# Patient Record
Sex: FEMALE | Race: BLACK OR AFRICAN AMERICAN | NOT HISPANIC OR LATINO | Employment: OTHER | URBAN - METROPOLITAN AREA
[De-identification: names, ages, dates, MRNs, and addresses within clinical notes are randomized per-mention and may not be internally consistent; named-entity substitution may affect disease eponyms.]

---

## 2017-04-08 ENCOUNTER — HOSPITAL ENCOUNTER (EMERGENCY)
Facility: OTHER | Age: 82
Discharge: HOME | End: 2017-04-08
Attending: EMERGENCY MEDICINE
Payer: MEDICARE

## 2017-04-08 VITALS
HEART RATE: 72 BPM | SYSTOLIC BLOOD PRESSURE: 108 MMHG | BODY MASS INDEX: 27.6 KG/M2 | TEMPERATURE: 98 F | DIASTOLIC BLOOD PRESSURE: 56 MMHG | WEIGHT: 150 LBS | OXYGEN SATURATION: 95 % | RESPIRATION RATE: 18 BRPM | HEIGHT: 62 IN

## 2017-04-08 DIAGNOSIS — N30.00 ACUTE CYSTITIS WITHOUT HEMATURIA: Primary | ICD-10-CM

## 2017-04-08 LAB
ALBUMIN SERPL BCP-MCNC: 3.8 G/DL
ALP SERPL-CCNC: 58 U/L
ALT SERPL W/O P-5'-P-CCNC: 19 U/L
ANION GAP SERPL CALC-SCNC: 12 MMOL/L
AST SERPL-CCNC: 17 U/L
BACTERIA #/AREA URNS HPF: ABNORMAL /HPF
BASOPHILS # BLD AUTO: 0 K/UL
BASOPHILS NFR BLD: 0 %
BILIRUB SERPL-MCNC: 0.5 MG/DL
BILIRUB UR QL STRIP: NEGATIVE
BUN SERPL-MCNC: 24 MG/DL
CALCIUM SERPL-MCNC: 8.3 MG/DL
CHLORIDE SERPL-SCNC: 103 MMOL/L
CLARITY UR: ABNORMAL
CO2 SERPL-SCNC: 23 MMOL/L
COLOR UR: YELLOW
CREAT SERPL-MCNC: 0.8 MG/DL
DIFFERENTIAL METHOD: ABNORMAL
EOSINOPHIL # BLD AUTO: 0 K/UL
EOSINOPHIL NFR BLD: 0.9 %
ERYTHROCYTE [DISTWIDTH] IN BLOOD BY AUTOMATED COUNT: 13.8 %
EST. GFR  (AFRICAN AMERICAN): >60 ML/MIN/1.73 M^2
EST. GFR  (NON AFRICAN AMERICAN): >60 ML/MIN/1.73 M^2
GLUCOSE SERPL-MCNC: 133 MG/DL
GLUCOSE UR QL STRIP: NEGATIVE
HCT VFR BLD AUTO: 43.4 %
HGB BLD-MCNC: 14.3 G/DL
HGB UR QL STRIP: ABNORMAL
KETONES UR QL STRIP: NEGATIVE
LEUKOCYTE ESTERASE UR QL STRIP: ABNORMAL
LIPASE SERPL-CCNC: 6 U/L
LYMPHOCYTES # BLD AUTO: 0.4 K/UL
LYMPHOCYTES NFR BLD: 7.6 %
MCH RBC QN AUTO: 29.5 PG
MCHC RBC AUTO-ENTMCNC: 32.9 %
MCV RBC AUTO: 90 FL
MICROSCOPIC COMMENT: ABNORMAL
MONOCYTES # BLD AUTO: 0.3 K/UL
MONOCYTES NFR BLD: 5.4 %
NEUTROPHILS # BLD AUTO: 4 K/UL
NEUTROPHILS NFR BLD: 85.7 %
NITRITE UR QL STRIP: POSITIVE
PH UR STRIP: 6 [PH] (ref 5–8)
PLATELET # BLD AUTO: 176 K/UL
PMV BLD AUTO: 9.8 FL
POTASSIUM SERPL-SCNC: 3.8 MMOL/L
PROT SERPL-MCNC: 6.8 G/DL
PROT UR QL STRIP: NEGATIVE
RBC # BLD AUTO: 4.85 M/UL
RBC #/AREA URNS HPF: 7 /HPF (ref 0–4)
SODIUM SERPL-SCNC: 138 MMOL/L
SP GR UR STRIP: 1.02 (ref 1–1.03)
SQUAMOUS #/AREA URNS HPF: 2 /HPF
URN SPEC COLLECT METH UR: ABNORMAL
UROBILINOGEN UR STRIP-ACNC: NEGATIVE EU/DL
WBC # BLD AUTO: 4.62 K/UL
WBC #/AREA URNS HPF: 40 /HPF (ref 0–5)
WBC CLUMPS URNS QL MICRO: ABNORMAL

## 2017-04-08 PROCEDURE — 96365 THER/PROPH/DIAG IV INF INIT: CPT

## 2017-04-08 PROCEDURE — 63600175 PHARM REV CODE 636 W HCPCS: Performed by: PHYSICIAN ASSISTANT

## 2017-04-08 PROCEDURE — 96375 TX/PRO/DX INJ NEW DRUG ADDON: CPT

## 2017-04-08 PROCEDURE — 87088 URINE BACTERIA CULTURE: CPT

## 2017-04-08 PROCEDURE — 96361 HYDRATE IV INFUSION ADD-ON: CPT

## 2017-04-08 PROCEDURE — 80053 COMPREHEN METABOLIC PANEL: CPT

## 2017-04-08 PROCEDURE — 83690 ASSAY OF LIPASE: CPT

## 2017-04-08 PROCEDURE — 85025 COMPLETE CBC W/AUTO DIFF WBC: CPT

## 2017-04-08 PROCEDURE — 81000 URINALYSIS NONAUTO W/SCOPE: CPT

## 2017-04-08 PROCEDURE — 25000003 PHARM REV CODE 250: Performed by: PHYSICIAN ASSISTANT

## 2017-04-08 PROCEDURE — 99284 EMERGENCY DEPT VISIT MOD MDM: CPT | Mod: 25

## 2017-04-08 PROCEDURE — 87086 URINE CULTURE/COLONY COUNT: CPT

## 2017-04-08 RX ORDER — CEPHALEXIN 500 MG/1
500 CAPSULE ORAL EVERY 12 HOURS
Qty: 14 CAPSULE | Refills: 0 | Status: SHIPPED | OUTPATIENT
Start: 2017-04-08 | End: 2017-04-15

## 2017-04-08 RX ORDER — ONDANSETRON 2 MG/ML
4 INJECTION INTRAMUSCULAR; INTRAVENOUS
Status: COMPLETED | OUTPATIENT
Start: 2017-04-08 | End: 2017-04-08

## 2017-04-08 RX ORDER — TRIMETHOPRIM 100 MG/1
100 TABLET ORAL DAILY
Status: ON HOLD | COMMUNITY
End: 2018-03-16

## 2017-04-08 RX ADMIN — CEFTRIAXONE 1 G: 1 INJECTION, SOLUTION INTRAVENOUS at 02:04

## 2017-04-08 RX ADMIN — ONDANSETRON 4 MG: 2 INJECTION, SOLUTION INTRAMUSCULAR; INTRAVENOUS at 01:04

## 2017-04-08 RX ADMIN — SODIUM CHLORIDE 1000 ML: 0.9 INJECTION, SOLUTION INTRAVENOUS at 01:04

## 2017-04-08 NOTE — ED PROVIDER NOTES
Encounter Date: 4/8/2017       History     Chief Complaint   Patient presents with    Nausea     reports history of UTIs, reports feeling nauseous this AM     Review of patient's allergies indicates:   Allergen Reactions    Aspirin Shortness Of Breath     HPI Comments: Patient is a 89 y.o. female with a past medical history of hyperlipidemia and frequent urinary tract infections, presenting to the emergency department with complaints of nausea and weakness.  The patient is accompanied by her daughter who is reporting a caregiver.  She reports that since this morning she's had persistent nausea with generalized weakness.  She denies vomiting or diarrhea.  She denies fever or chills.  The patient's daughter states that the patient somehow obtain a pie from an unknown date and source and was found to have eaten it all this morning.  The patient's daughter reports that she typically does not eat a large amount of sweets.  She states she is concerned that food may be old as well.  The patient is also reporting some mild back pain.  The patient's daughter states that this is one of her typical symptoms when she has a urinary tract infection.  She admits she is treated by a urologist at [facility] for recurrent UTIs with Bactrim. She states she takes 2 months of the antibiotic, takes 2 weeks off and restarts. Patient is currently in the middle of the 2 weeks of no antibiotic treatment. She denies fever, chills, dysuria.     The history is provided by the patient.     Past Medical History:   Diagnosis Date    Hyperlipemia     UTI (urinary tract infection)      Past Surgical History:   Procedure Laterality Date    CHOLECYSTECTOMY      HEMORRHOID SURGERY      HERNIA REPAIR      HYSTERECTOMY      KNEE SURGERY       History reviewed. No pertinent family history.  Social History   Substance Use Topics    Smoking status: Never Smoker    Smokeless tobacco: None    Alcohol use No     Review of Systems   Constitutional:  Positive for fatigue. Negative for activity change, appetite change, chills and fever.   HENT: Negative for congestion, rhinorrhea and sore throat.    Eyes: Negative for photophobia and visual disturbance.   Respiratory: Negative for cough, shortness of breath and wheezing.    Cardiovascular: Negative for chest pain.   Gastrointestinal: Positive for nausea. Negative for abdominal pain, diarrhea and vomiting.   Genitourinary: Negative for dysuria, hematuria and urgency.   Musculoskeletal: Positive for back pain. Negative for myalgias and neck pain.   Skin: Negative for color change and wound.   Neurological: Negative for weakness and headaches.   Psychiatric/Behavioral: Negative for agitation and confusion.       Physical Exam   Initial Vitals   BP Pulse Resp Temp SpO2   04/08/17 1234 04/08/17 1234 04/08/17 1234 04/08/17 1234 04/08/17 1234   108/63 97 18 98 °F (36.7 °C) 96 %     Physical Exam    Nursing note and vitals reviewed.  Constitutional: Vital signs are normal. She appears well-developed and well-nourished. She is not diaphoretic. She is cooperative.  Non-toxic appearance. She does not have a sickly appearance. She does not appear ill. No distress.   Elderly female accompanied by her daughter in the emergency department.  She is speaking in clear and full sentences, is in no acute distress.   HENT:   Head: Normocephalic and atraumatic.   Right Ear: External ear normal.   Left Ear: External ear normal.   Nose: Nose normal.   Mouth/Throat: Oropharynx is clear and moist.   Eyes: Conjunctivae and EOM are normal.   Neck: Normal range of motion. Neck supple.   Cardiovascular: Normal rate, regular rhythm and normal heart sounds.   Pulmonary/Chest: Breath sounds normal. No respiratory distress. She has no wheezes.   Abdominal: Soft. Bowel sounds are normal. She exhibits no distension. There is no tenderness. There is no rebound and no guarding.   Musculoskeletal: Normal range of motion.   Neurological: She is alert  and oriented to person, place, and time. GCS eye subscore is 4. GCS verbal subscore is 5. GCS motor subscore is 6.   AAOx4.    Skin: Skin is warm.   Psychiatric: She has a normal mood and affect. Her behavior is normal. Judgment and thought content normal.         ED Course   Procedures  Labs Reviewed   URINALYSIS - Abnormal; Notable for the following:        Result Value    Appearance, UA Hazy (*)     Occult Blood UA 1+ (*)     Nitrite, UA Positive (*)     Leukocytes, UA 1+ (*)     All other components within normal limits   CBC W/ AUTO DIFFERENTIAL - Abnormal; Notable for the following:     Lymph # 0.4 (*)     Gran% 85.7 (*)     Lymph% 7.6 (*)     All other components within normal limits   COMPREHENSIVE METABOLIC PANEL - Abnormal; Notable for the following:     Glucose 133 (*)     Calcium 8.3 (*)     All other components within normal limits   URINALYSIS MICROSCOPIC - Abnormal; Notable for the following:     RBC, UA 7 (*)     WBC, UA 40 (*)     WBC Clumps, UA Occasional (*)     Bacteria, UA Many (*)     All other components within normal limits   CULTURE, URINE   LIPASE             Medical Decision Making:   History:   Old Medical Records: I decided to obtain old medical records.  Old Records Summarized: other records.       <> Summary of Records: Reviewed medical record in Epic.  Patient has extensive history of urosepsis.  Clinical Tests:   Lab Tests: Ordered and Reviewed  The following lab test(s) were unremarkable: CBC, CMP, Lipase and Urinalysis  Other:   I have discussed this case with another health care provider.       <> Summary of the Discussion: Dr. Longo  This note was created using Dragon Medical Dictation. There may be typographical errors secondary to dictation.     Urgent evaluation of a 89 y.o. female with a past medical history of HLD and UTI, presenting to the emergency department complaining of weakness and nausea. Patient is afebrile, nontoxic appearing and hemodynamically stable.   Physical exam reveals regular rate and rhythm, lungs are clear to auscultation bilaterally.  Abdomen is soft and nontender with normal bowel sounds.  I reviewed the patient's medical record that shows an extensive history of urosepsis.  Patient has normal vital signs on arrival, no evidence of sepsis at this time.  Will plan to obtain blood work, UA, administer IV fluids, Zofran and reassess.  UA shows nitrite positive urinary tract infection.  CBC shows no leukocytosis, normal H&H.  CMP shows no acute electrolyte abnormalitiy, normal BUN and creatinine.  Lipase is normal.  Will plan to send a urine culture.  I did review the patient's previous urine culture that showed susceptibility to ceftriaxone as well as Keflex.  Will administer 1 g of ceftriaxone the emergency department and plan to discharge home with Keflex.  The patient was counseled extensively on symptomatically care and treatment, I did recommend that she obtain close follow-up with her urologist. The patient was instructed to follow up with a primary care provider in 2 days or to return to the emergency department for worsening symptoms. The treatment plan was discussed with the patient who demonstrated understanding and comfort with plan. The patient's history, physical exam, and plan of care was discussed with and agreed upon with my supervising physician.     Past Medical History:   Diagnosis Date    Hyperlipemia     UTI (urinary tract infection)                      ED Course     Clinical Impression:     1. Acute cystitis without hematuria         Disposition:   Disposition: Discharged  Condition: Stable       Zainab Mendoza PA-C  04/08/17 1503

## 2017-04-08 NOTE — ED AVS SNAPSHOT
[facility]  [address]  [facility] [address]               Cammy Brown   2017 12:37 PM   ED    Description:  Female : 8/10/1925   Department:  [facility]           Your Care was Coordinated By:     Provider Role From To    Donaldo Longo DO Attending Provider 17 1240 --    Zainab Mendoza PA-C Physician Assistant 17 1239 --      Reason for Visit     Nausea           Diagnoses this Visit        Comments    Acute cystitis without hematuria    -  Primary       ED Disposition     None           To Do List           Follow-up Information     Follow up with Domo Owens MD In 2 days.    Specialty:  Internal Medicine    Contact information:    [address] [id number] [phone]          Follow up with [facility].    Specialty:  Emergency Medicine    Why:  If symptoms worsen    Contact information:    [address]  [facility] [address] [phone]       These Medications        Disp Refills Start End    cephALEXin (KEFLEX) 500 MG capsule 14 capsule 0 2017 4/15/2017    Take 1 capsule (500 mg total) by mouth every 12 (twelve) hours. - Oral    Pharmacy: [facility]/pharmacy #[id number] - [place], [place] - [address]  #: [phone]         [facility]     [facility] Nurse Care Line - 24/7 Assistance  Unless otherwise directed by your provider, please contact [facility], our nurse care line that is available for 24/7 assistance.     Registered nurses in the [facility] provide: appointment scheduling, clinical advisement, health education, and other advisory services.  Call: [phone] (toll free)               Medications           Message regarding Medications     Verify the changes and/or additions to your medication regime listed below are the same as discussed with your clinician today.  If any of these changes or  "additions are incorrect, please notify your healthcare provider.        START taking these NEW medications        Refills    cephALEXin (KEFLEX) 500 MG capsule 0    Sig: Take 1 capsule (500 mg total) by mouth every 12 (twelve) hours.    Class: Print    Route: Oral      These medications were administered today        Dose Freq    sodium chloride 0.9% bolus 1,000 mL 1,000 mL ED 1 Time    Sig: Inject 1,000 mLs into the vein ED 1 Time.    Class: Normal    Route: Intravenous    ondansetron injection 4 mg 4 mg ED 1 Time    Sig: Inject 4 mg into the vein ED 1 Time.    Class: Normal    Route: Intravenous    cefTRIAXone (ROCEPHIN) 1 g in dextrose 5 % 50 mL IVPB 1 g ED 1 Time    Sig: Inject 50 mLs (1 g total) into the vein ED 1 Time.    Class: Normal    Route: Intravenous           Verify that the below list of medications is an accurate representation of the medications you are currently taking.  If none reported, the list may be blank. If incorrect, please contact your healthcare provider. Carry this list with you in case of emergency.           Current Medications     darifenacin (ENABLEX) 15 mg 24 hr tablet Take 15 mg by mouth once daily.    dorzolamide-timolol 2-0.5% (COSOPT) 22.3-6.8 mg/mL ophthalmic solution Place 1 drop into both eyes 2 (two) times daily.    simvastatin (ZOCOR) 40 MG tablet Take 40 mg by mouth every evening.    trimethoprim (TRIMPEX) 100 mg Tab Take 100 mg by mouth once daily. Daily for 2 months, then off for 2 weeks, (scheduled to restart 4/12/17)    cefTRIAXone (ROCEPHIN) 1 g in dextrose 5 % 50 mL IVPB Inject 50 mLs (1 g total) into the vein ED 1 Time.    cephALEXin (KEFLEX) 500 MG capsule Take 1 capsule (500 mg total) by mouth every 12 (twelve) hours.    EYE DROPS DISPENSER MISC Place 1 drop into both eyes 2 (two) times daily.            Clinical Reference Information           Your Vitals Were     BP Pulse Temp Resp Height Weight    113/55 76 98 °F (36.7 °C) 18 5' 2" (1.575 m) 68 kg (150 lb)    " Last Period SpO2 BMI          (LMP Unknown) 95% 27.44 kg/m2        Allergies as of 4/8/2017        Reactions    Aspirin Shortness Of Breath      Immunizations Administered on Date of Encounter - 4/8/2017     None      ED Micro, Lab, POCT     Start Ordered       Status Ordering Provider    04/08/17 1451 04/08/17 1450  Urine culture  Add-on      Completed     04/08/17 1250 04/08/17 1250  Urinalysis  STAT      Final result     04/08/17 1250 04/08/17 1250  CBC auto differential  STAT      Final result     04/08/17 1250 04/08/17 1250  Comprehensive metabolic panel  STAT      Final result     04/08/17 1250 04/08/17 1250  Lipase  STAT      Final result     04/08/17 1250 04/08/17 1250  Urinalysis Microscopic  Once      Final result       ED Imaging Orders     None      Discharge References/Attachments     BLADDER INFECTION, FEMALE (ADULT) (ENGLISH)    NAUSEA AND VOMITING, HOW TO CONTROL (ENGLISH)      [facility] Sign-Up     Activating your [facility] account is as easy as 1-2-3!     1) Visit [email or web address], select Sign Up Now, enter this activation code and your date of birth, then select Next.  [email or web address]  Expires: 5/23/2017  2:54 PM      2) Create a username and password to use when you visit [facility] in the future and select a security question in case you lose your password and select Next.    3) Enter your e-mail address and click Sign Up!    Additional Information  If you have questions, please e-mail [email or web address] or call [phone] to talk to our [facility] staff. Remember, [facility] is NOT to be used for urgent needs. For medical emergencies, dial 911.          [facility] complies with applicable Federal civil rights laws and does not discriminate on the basis of race, color, national origin, age, disability, or sex.        Language Assistance Services     ATTENTION: Language assistance services are available, free of charge. Please call [phone].      ATENCIÓN: Israel velasco  español, tiene a neal disposición servicios gratuitos de asistencia lingüística. Llame al [phone].     VANE Ý: N?u b?n nói Ti?ng Vi?t, có các d?ch v? h? tr? ngôn ng? mi?n phí dành cho b?n. G?i s? [phone].

## 2017-04-08 NOTE — ED TRIAGE NOTES
Pt reports to ED c/o nausea starting 0400, denies vomiting/diarrhea. Pt also reporting bilateral lower back pain worsening with ambulation, PMH of chronic UTI, pt on routine PO antibiotics to treat UTI, pt due to start abx Wednesday. Denies fevers, chills, dysuria/hematuria, V/D, chest pain, sOB.

## 2017-04-10 LAB — BACTERIA UR CULT: NORMAL

## 2018-03-14 ENCOUNTER — HOSPITAL ENCOUNTER (OUTPATIENT)
Facility: OTHER | Age: 83
Discharge: HOME | End: 2018-03-16
Attending: EMERGENCY MEDICINE | Admitting: EMERGENCY MEDICINE
Payer: MEDICARE

## 2018-03-14 DIAGNOSIS — N30.01 ACUTE CYSTITIS WITH HEMATURIA: ICD-10-CM

## 2018-03-14 DIAGNOSIS — R53.83 LETHARGY: ICD-10-CM

## 2018-03-14 DIAGNOSIS — R93.89 ABNORMAL CHEST X-RAY: ICD-10-CM

## 2018-03-14 DIAGNOSIS — N39.0 URINARY TRACT INFECTION WITHOUT HEMATURIA, SITE UNSPECIFIED: Primary | ICD-10-CM

## 2018-03-14 PROBLEM — J18.9 RIGHT LOWER LOBE PNEUMONIA: Status: ACTIVE | Noted: 2018-03-14

## 2018-03-14 LAB
ALBUMIN SERPL BCP-MCNC: 3.6 G/DL
ALP SERPL-CCNC: 60 U/L
ALT SERPL W/O P-5'-P-CCNC: 13 U/L
ANION GAP SERPL CALC-SCNC: 12 MMOL/L
AST SERPL-CCNC: 17 U/L
BACTERIA #/AREA URNS HPF: ABNORMAL /HPF
BASOPHILS # BLD AUTO: 0.02 K/UL
BASOPHILS NFR BLD: 0.2 %
BILIRUB SERPL-MCNC: 0.9 MG/DL
BILIRUB UR QL STRIP: NEGATIVE
BUN SERPL-MCNC: 17 MG/DL
CALCIUM SERPL-MCNC: 9.3 MG/DL
CHLORIDE SERPL-SCNC: 106 MMOL/L
CLARITY UR: ABNORMAL
CO2 SERPL-SCNC: 20 MMOL/L
COLOR UR: YELLOW
CREAT SERPL-MCNC: 0.7 MG/DL
DIFFERENTIAL METHOD: ABNORMAL
EOSINOPHIL # BLD AUTO: 0 K/UL
EOSINOPHIL NFR BLD: 0.2 %
ERYTHROCYTE [DISTWIDTH] IN BLOOD BY AUTOMATED COUNT: 13.7 %
EST. GFR  (AFRICAN AMERICAN): >60 ML/MIN/1.73 M^2
EST. GFR  (NON AFRICAN AMERICAN): >60 ML/MIN/1.73 M^2
GLUCOSE SERPL-MCNC: 122 MG/DL
GLUCOSE UR QL STRIP: NEGATIVE
HCT VFR BLD AUTO: 41.1 %
HGB BLD-MCNC: 13.3 G/DL
HGB UR QL STRIP: ABNORMAL
HYALINE CASTS #/AREA URNS LPF: 0 /LPF
KETONES UR QL STRIP: ABNORMAL
LACTATE SERPL-SCNC: 1 MMOL/L
LEUKOCYTE ESTERASE UR QL STRIP: ABNORMAL
LYMPHOCYTES # BLD AUTO: 0.9 K/UL
LYMPHOCYTES NFR BLD: 7.4 %
MCH RBC QN AUTO: 29.3 PG
MCHC RBC AUTO-ENTMCNC: 32.4 G/DL
MCV RBC AUTO: 91 FL
MICROSCOPIC COMMENT: ABNORMAL
MONOCYTES # BLD AUTO: 0.9 K/UL
MONOCYTES NFR BLD: 7.5 %
NEUTROPHILS # BLD AUTO: 10.4 K/UL
NEUTROPHILS NFR BLD: 84 %
NITRITE UR QL STRIP: POSITIVE
PH UR STRIP: 6 [PH] (ref 5–8)
PLATELET # BLD AUTO: 213 K/UL
PMV BLD AUTO: 10.3 FL
POTASSIUM SERPL-SCNC: 3.7 MMOL/L
PROT SERPL-MCNC: 7.3 G/DL
PROT UR QL STRIP: ABNORMAL
RBC # BLD AUTO: 4.54 M/UL
RBC #/AREA URNS HPF: 5 /HPF (ref 0–4)
SODIUM SERPL-SCNC: 138 MMOL/L
SP GR UR STRIP: 1.02 (ref 1–1.03)
URN SPEC COLLECT METH UR: ABNORMAL
UROBILINOGEN UR STRIP-ACNC: NEGATIVE EU/DL
WBC # BLD AUTO: 12.3 K/UL
WBC #/AREA URNS HPF: >100 /HPF (ref 0–5)
WBC CLUMPS URNS QL MICRO: ABNORMAL

## 2018-03-14 PROCEDURE — 83605 ASSAY OF LACTIC ACID: CPT

## 2018-03-14 PROCEDURE — 87186 SC STD MICRODIL/AGAR DIL: CPT

## 2018-03-14 PROCEDURE — 85025 COMPLETE CBC W/AUTO DIFF WBC: CPT

## 2018-03-14 PROCEDURE — G0378 HOSPITAL OBSERVATION PER HR: HCPCS

## 2018-03-14 PROCEDURE — 96361 HYDRATE IV INFUSION ADD-ON: CPT

## 2018-03-14 PROCEDURE — 63600175 PHARM REV CODE 636 W HCPCS: Performed by: PHYSICIAN ASSISTANT

## 2018-03-14 PROCEDURE — 99284 EMERGENCY DEPT VISIT MOD MDM: CPT | Mod: 25

## 2018-03-14 PROCEDURE — 81000 URINALYSIS NONAUTO W/SCOPE: CPT

## 2018-03-14 PROCEDURE — 80053 COMPREHEN METABOLIC PANEL: CPT

## 2018-03-14 PROCEDURE — 87077 CULTURE AEROBIC IDENTIFY: CPT

## 2018-03-14 PROCEDURE — 87088 URINE BACTERIA CULTURE: CPT

## 2018-03-14 PROCEDURE — 87040 BLOOD CULTURE FOR BACTERIA: CPT

## 2018-03-14 PROCEDURE — 25000003 PHARM REV CODE 250: Performed by: PHYSICIAN ASSISTANT

## 2018-03-14 PROCEDURE — 87086 URINE CULTURE/COLONY COUNT: CPT

## 2018-03-14 PROCEDURE — 96374 THER/PROPH/DIAG INJ IV PUSH: CPT

## 2018-03-14 RX ORDER — SIMVASTATIN 10 MG/1
40 TABLET, FILM COATED ORAL NIGHTLY
Status: DISCONTINUED | OUTPATIENT
Start: 2018-03-14 | End: 2018-03-16 | Stop reason: HOSPADM

## 2018-03-14 RX ORDER — ONDANSETRON 2 MG/ML
4 INJECTION INTRAMUSCULAR; INTRAVENOUS EVERY 8 HOURS PRN
Status: DISCONTINUED | OUTPATIENT
Start: 2018-03-14 | End: 2018-03-16 | Stop reason: HOSPADM

## 2018-03-14 RX ORDER — SODIUM CHLORIDE 9 MG/ML
1000 INJECTION, SOLUTION INTRAVENOUS
Status: COMPLETED | OUTPATIENT
Start: 2018-03-14 | End: 2018-03-14

## 2018-03-14 RX ORDER — ACETAMINOPHEN 325 MG/1
650 TABLET ORAL EVERY 4 HOURS PRN
Status: DISCONTINUED | OUTPATIENT
Start: 2018-03-14 | End: 2018-03-16 | Stop reason: HOSPADM

## 2018-03-14 RX ORDER — HEPARIN SODIUM 5000 [USP'U]/ML
5000 INJECTION, SOLUTION INTRAVENOUS; SUBCUTANEOUS EVERY 8 HOURS
Status: DISCONTINUED | OUTPATIENT
Start: 2018-03-14 | End: 2018-03-16 | Stop reason: HOSPADM

## 2018-03-14 RX ORDER — CEFTRIAXONE 1 G/1
1 INJECTION, POWDER, FOR SOLUTION INTRAMUSCULAR; INTRAVENOUS
Status: COMPLETED | OUTPATIENT
Start: 2018-03-14 | End: 2018-03-14

## 2018-03-14 RX ORDER — DORZOLAMIDE HYDROCHLORIDE AND TIMOLOL MALEATE 20; 5 MG/ML; MG/ML
1 SOLUTION/ DROPS OPHTHALMIC 2 TIMES DAILY
Status: DISCONTINUED | OUTPATIENT
Start: 2018-03-14 | End: 2018-03-16 | Stop reason: HOSPADM

## 2018-03-14 RX ORDER — SODIUM CHLORIDE 0.9 % (FLUSH) 0.9 %
5 SYRINGE (ML) INJECTION
Status: DISCONTINUED | OUTPATIENT
Start: 2018-03-14 | End: 2018-03-16 | Stop reason: HOSPADM

## 2018-03-14 RX ORDER — AZITHROMYCIN 250 MG/1
500 TABLET, FILM COATED ORAL
Status: COMPLETED | OUTPATIENT
Start: 2018-03-14 | End: 2018-03-14

## 2018-03-14 RX ORDER — ACETAMINOPHEN 500 MG
1000 TABLET ORAL
Status: COMPLETED | OUTPATIENT
Start: 2018-03-14 | End: 2018-03-14

## 2018-03-14 RX ADMIN — AZITHROMYCIN 500 MG: 250 TABLET, FILM COATED ORAL at 09:03

## 2018-03-14 RX ADMIN — CEFTRIAXONE SODIUM 1 G: 1 INJECTION, POWDER, FOR SOLUTION INTRAMUSCULAR; INTRAVENOUS at 08:03

## 2018-03-14 RX ADMIN — ACETAMINOPHEN 1000 MG: 500 TABLET ORAL at 06:03

## 2018-03-14 RX ADMIN — SODIUM CHLORIDE 1000 ML: 0.9 INJECTION, SOLUTION INTRAVENOUS at 06:03

## 2018-03-14 NOTE — ED PROVIDER NOTES
Encounter Date: 3/14/2018       History     Chief Complaint   Patient presents with    Lethargy     x 2 dats. Pt currently on trimpex, takes bimonthly abx regimen for chronic UTI. Daughter states lethargy and symptoms consistant with UTIs.      Patient is a 89-year-old female with hyperlipidemia and recurrent UTIs who presents to the ED with drowsiness.  Patient's daughter, who lives with her, states she has been laying in bed and sleeping all day today.  She states this is consistent when she has developed a urinary tract infection.  Patient is currently on a regimen of trimethoprim (2 weeks on and one week off) and is currently on day 7 of this regimen.  Patient's urologist is Dr. Reed at [facility]. Patient's daughter states at baseline she is more talkative and ambulates with a walker.  Daughter denies taking her temperature today.  Patient denies cough, congestion, shortness of breath, chest pain or back pain.      The history is provided by the patient.     Review of patient's allergies indicates:   Allergen Reactions    Aspirin Shortness Of Breath     Past Medical History:   Diagnosis Date    Hyperlipemia     UTI (urinary tract infection)      Past Surgical History:   Procedure Laterality Date    CHOLECYSTECTOMY      HEMORRHOID SURGERY      HERNIA REPAIR      HYSTERECTOMY      KNEE SURGERY       No family history on file.  Social History   Substance Use Topics    Smoking status: Never Smoker    Smokeless tobacco: Not on file    Alcohol use No     Review of Systems   Constitutional: Negative for chills and fever.        Increased sleepiness and decreased appetite   HENT: Negative for congestion and sore throat.    Eyes: Negative for pain.   Respiratory: Negative for shortness of breath.    Cardiovascular: Negative for chest pain.   Gastrointestinal: Negative for abdominal pain, diarrhea, nausea and vomiting.   Genitourinary: Negative for dysuria, flank pain and hematuria.   Musculoskeletal: Negative  for arthralgias.   Skin: Negative for rash.   Neurological: Negative for numbness and headaches.       Physical Exam     Initial Vitals [03/14/18 1605]   BP Pulse Resp Temp SpO2   (!) 156/82 100 16 97.6 °F (36.4 °C) 99 %      MAP       106.67         Physical Exam    Constitutional: Vital signs are normal. She is not diaphoretic. She is cooperative. No distress.   Patient is alert and awake.  She answers questions appropriately.   HENT:   Head: Normocephalic and atraumatic.   Mildly dry mucous membranes    Eyes: EOM are normal. Pupils are equal, round, and reactive to light.   Neck: Normal range of motion. Neck supple.   Cardiovascular: Normal rate, regular rhythm, normal heart sounds and intact distal pulses.   No murmur heard.  Pulmonary/Chest: No respiratory distress. She has no wheezes. She has no rhonchi. She has no rales.   Diminished air movement at the lung bases.   Abdominal: Soft. Bowel sounds are normal. There is no tenderness. There is no rebound and no guarding.   No CVA tenderness bilaterally    Musculoskeletal: Normal range of motion. She exhibits no edema or tenderness.   Neurological: She is alert and oriented to person, place, and time. No cranial nerve deficit. GCS eye subscore is 4. GCS verbal subscore is 5. GCS motor subscore is 6.   Skin: Skin is warm and dry. Capillary refill takes less than 2 seconds. No rash noted.   Psychiatric: She has a normal mood and affect. Her behavior is normal.         ED Course   Procedures  Labs Reviewed   URINALYSIS - Abnormal; Notable for the following:        Result Value    Appearance, UA Hazy (*)     Protein, UA 2+ (*)     Ketones, UA 1+ (*)     Occult Blood UA 2+ (*)     Nitrite, UA Positive (*)     Leukocytes, UA 2+ (*)     All other components within normal limits   CBC W/ AUTO DIFFERENTIAL - Abnormal; Notable for the following:     Gran # (ANC) 10.4 (*)     Lymph # 0.9 (*)     Gran% 84.0 (*)     Lymph% 7.4 (*)     All other components within normal limits    COMPREHENSIVE METABOLIC PANEL - Abnormal; Notable for the following:     CO2 20 (*)     Glucose 122 (*)     All other components within normal limits   URINALYSIS MICROSCOPIC - Abnormal; Notable for the following:     RBC, UA 5 (*)     WBC, UA >100 (*)     Bacteria, UA Many (*)     All other components within normal limits   CULTURE, URINE   CULTURE, BLOOD   CULTURE, BLOOD   CULTURE, URINE   LACTIC ACID, PLASMA         Imaging Results          X-Ray Chest AP Portable (Final result)  Result time 03/14/18 19:03:09    Final result by Kieran Clark MD (03/14/18 19:03:09)                 Impression:      As above.      Electronically signed by: KIERAN CLARK MD  Date:     03/14/18  Time:    19:03              Narrative:    Chest AP single view.  Comparison: 5/2016.    Cardiac silhouette is normal in size.  Lungs are symmetrically expanded.  There is nonspecific diffuse interstitial prominence which may reflect chronic change versus mild interstitial edema.  Focal airspace opacity is seen within the right lower lung zone which could reflect developing infiltrate.  Future followup is recommended to ensure resolution.      No acute osseous abnormality identified.  Chronic fracture deformity seen of the left humeral head.                                 Medical Decision Making:   Initial Assessment:   Urgent evaluation of a 89 y.o. female presenting to the emergency department complaining of drowsiness. Patient is afebrile, nontoxic appearing and hemodynamically stable.  ED Management:  Upon repeat of vitals, 2 hours after triage, patient is febrile and heart rate is over 90.  Patient meets SIRS criteria.  Will obtain urinalysis, chest x-ray, and labs.  With suspected UTI, patient now meet sepsis criteria.  Lab work reveals no leukocytosis.  No anemia.  Bicarbonate is 20.  No other significant electrolyte disturbance.  Normal kidney function.   Chest x-ray reveals nonspecific, diffuse interstitial prominence which may  reflect chronic changes versus mild interstitial edema.  There is a focal airspace opacities in the right lower lung zone which could reflect developing infiltrate.  Blood cultures are pending.  Urinalysis is positive for UTI.  Positive nitrites and 2+ leukocytes.  5 RBCs and over 100 WBCs.   Patient given IV Rocephin and azithromycin to cover for community-acquired pneumonia and UTI.  Family is more comfortable patient staying overnight.  This note was created using Dragon Medical Dictation. There may be typographical errors secondary to dictation.                       Clinical Impression:     1. Urinary tract infection without hematuria, site unspecified    2. Lethargy    3. Abnormal chest x-ray                             Matias Grove PA-C  03/14/18 7711

## 2018-03-15 LAB
ALBUMIN SERPL BCP-MCNC: 3.3 G/DL
ALP SERPL-CCNC: 61 U/L
ALT SERPL W/O P-5'-P-CCNC: 11 U/L
ANION GAP SERPL CALC-SCNC: 9 MMOL/L
AST SERPL-CCNC: 13 U/L
BASOPHILS # BLD AUTO: 0.02 K/UL
BASOPHILS NFR BLD: 0.2 %
BILIRUB SERPL-MCNC: 0.7 MG/DL
BUN SERPL-MCNC: 14 MG/DL
CALCIUM SERPL-MCNC: 9.2 MG/DL
CHLORIDE SERPL-SCNC: 105 MMOL/L
CO2 SERPL-SCNC: 25 MMOL/L
CREAT SERPL-MCNC: 0.7 MG/DL
DIFFERENTIAL METHOD: ABNORMAL
EOSINOPHIL # BLD AUTO: 0.1 K/UL
EOSINOPHIL NFR BLD: 0.9 %
ERYTHROCYTE [DISTWIDTH] IN BLOOD BY AUTOMATED COUNT: 13.7 %
EST. GFR  (AFRICAN AMERICAN): >60 ML/MIN/1.73 M^2
EST. GFR  (NON AFRICAN AMERICAN): >60 ML/MIN/1.73 M^2
GLUCOSE SERPL-MCNC: 102 MG/DL
HCT VFR BLD AUTO: 41.5 %
HGB BLD-MCNC: 13.1 G/DL
LYMPHOCYTES # BLD AUTO: 1.1 K/UL
LYMPHOCYTES NFR BLD: 12.5 %
MAGNESIUM SERPL-MCNC: 2 MG/DL
MCH RBC QN AUTO: 28.7 PG
MCHC RBC AUTO-ENTMCNC: 31.6 G/DL
MCV RBC AUTO: 91 FL
MONOCYTES # BLD AUTO: 0.7 K/UL
MONOCYTES NFR BLD: 8.6 %
NEUTROPHILS # BLD AUTO: 6.5 K/UL
NEUTROPHILS NFR BLD: 77.1 %
PHOSPHATE SERPL-MCNC: 2.2 MG/DL
PLATELET # BLD AUTO: 187 K/UL
PMV BLD AUTO: 9.9 FL
POTASSIUM SERPL-SCNC: 3.2 MMOL/L
PROT SERPL-MCNC: 6.7 G/DL
RBC # BLD AUTO: 4.56 M/UL
SODIUM SERPL-SCNC: 139 MMOL/L
WBC # BLD AUTO: 8.45 K/UL

## 2018-03-15 PROCEDURE — 80053 COMPREHEN METABOLIC PANEL: CPT

## 2018-03-15 PROCEDURE — 99220 PR INITIAL OBSERVATION CARE,LEVL III: CPT | Mod: ,,, | Performed by: HOSPITALIST

## 2018-03-15 PROCEDURE — 83735 ASSAY OF MAGNESIUM: CPT

## 2018-03-15 PROCEDURE — 84100 ASSAY OF PHOSPHORUS: CPT

## 2018-03-15 PROCEDURE — 25000003 PHARM REV CODE 250: Performed by: NURSE PRACTITIONER

## 2018-03-15 PROCEDURE — G0378 HOSPITAL OBSERVATION PER HR: HCPCS

## 2018-03-15 PROCEDURE — 63600175 PHARM REV CODE 636 W HCPCS: Performed by: NURSE PRACTITIONER

## 2018-03-15 PROCEDURE — 85025 COMPLETE CBC W/AUTO DIFF WBC: CPT

## 2018-03-15 PROCEDURE — 36415 COLL VENOUS BLD VENIPUNCTURE: CPT

## 2018-03-15 RX ADMIN — DORZOLAMIDE HYDROCHLORIDE AND TIMOLOL MALEATE 1 DROP: 20; 5 SOLUTION/ DROPS OPHTHALMIC at 12:03

## 2018-03-15 RX ADMIN — HEPARIN SODIUM 5000 UNITS: 5000 INJECTION, SOLUTION INTRAVENOUS; SUBCUTANEOUS at 09:03

## 2018-03-15 RX ADMIN — AZITHROMYCIN MONOHYDRATE 500 MG: 500 INJECTION, POWDER, LYOPHILIZED, FOR SOLUTION INTRAVENOUS at 09:03

## 2018-03-15 RX ADMIN — DORZOLAMIDE HYDROCHLORIDE AND TIMOLOL MALEATE 1 DROP: 20; 5 SOLUTION/ DROPS OPHTHALMIC at 08:03

## 2018-03-15 RX ADMIN — HEPARIN SODIUM 5000 UNITS: 5000 INJECTION, SOLUTION INTRAVENOUS; SUBCUTANEOUS at 06:03

## 2018-03-15 RX ADMIN — HEPARIN SODIUM 5000 UNITS: 5000 INJECTION, SOLUTION INTRAVENOUS; SUBCUTANEOUS at 12:03

## 2018-03-15 RX ADMIN — SIMVASTATIN 40 MG: 10 TABLET, FILM COATED ORAL at 08:03

## 2018-03-15 RX ADMIN — SIMVASTATIN 40 MG: 10 TABLET, FILM COATED ORAL at 12:03

## 2018-03-15 RX ADMIN — CEFTRIAXONE 1 G: 1 INJECTION, SOLUTION INTRAVENOUS at 08:03

## 2018-03-15 RX ADMIN — DORZOLAMIDE HYDROCHLORIDE AND TIMOLOL MALEATE 1 DROP: 20; 5 SOLUTION/ DROPS OPHTHALMIC at 09:03

## 2018-03-15 RX ADMIN — ACETAMINOPHEN 650 MG: 500 TABLET ORAL at 12:03

## 2018-03-15 NOTE — HPI
Ms. Brown is a 89 year old woman who presented to the ED with sleepiness and lethargy.  Her daughter who lives with her stated patient had been sleeping all day, and this is a typical presentation for her when she has a UTI.  Patient gets recurrent UTIs and is on a regimen of trimethoprim 2 weeks on and one week off, and currently is on day 7 of this regimen.  Her urologist is Dr. Reed at [facility].  At baseline patient is talkative and ambulates with a walker.  She denied any other symptoms such as fever or dysuria.  UA done in ED was positive for nitrite, bacteria and leukocytes.  Her temp was 100.9 on presentation and WBC 12.3K.

## 2018-03-15 NOTE — ASSESSMENT & PLAN NOTE
UA positive for bacteria, WBCs, positive nitrates and ketones. Lactic acid 1    Urine culture pending  Rocephin ordered

## 2018-03-15 NOTE — ASSESSMENT & PLAN NOTE
CXR- Cardiac silhouette is normal in size.  Lungs are symmetrically expanded.  There is nonspecific diffuse interstitial prominence which may reflect chronic change versus mild interstitial edema.  Focal airspace opacity is seen within the right lower lung zone which could reflect developing infiltrate.  Future followup is recommended to ensure resolution    Will add Azithromycin to Rocephin to cover

## 2018-03-15 NOTE — ED NOTES
Report given to MICHELLE Cortez on [address] [place] [address].  Patient informed of room assignment and prepared for transfer.

## 2018-03-15 NOTE — H&P
[facility] Hospital Medicine  History & Physical    Patient Name: Cammy Brown  MRN: [id number]  Admission Date: 3/14/2018  Attending Physician: Sangita Luo MD   Primary Care Provider: Domo Owens MD         Patient information was obtained from patient, past medical records and ER records.     Subjective:     Principal Problem:Urinary tract infection without hematuria    Chief Complaint:   Chief Complaint   Patient presents with    Lethargy     x 2 dats. Pt currently on trimpex, takes bimonthly abx regimen for chronic UTI. Daughter states lethargy and symptoms consistant with UTIs.         HPI: Patient is a 89-year-old female with hyperlipidemia and recurrent UTIs who presents to the ED with drowsiness.  Patient's daughter, who lives with her, states she has been laying in bed and sleeping all day today.  She states this is consistent when she has developed a urinary tract infection.  Patient is currently on a regimen of trimethoprim (2 weeks on and one week off) and is currently on day 7 of this regimen.  Patient's urologist is Dr. Reed at [facility]. Patient's daughter states at baseline she is more talkative and ambulates with a walker.  Daughter denies taking her temperature today.  Patient denies cough, congestion, shortness of breath, chest pain or back pain.    Past Medical History:   Diagnosis Date    Hyperlipemia     UTI (urinary tract infection)        Past Surgical History:   Procedure Laterality Date    CHOLECYSTECTOMY      HEMORRHOID SURGERY      HERNIA REPAIR      HYSTERECTOMY      KNEE SURGERY         Review of patient's allergies indicates:   Allergen Reactions    Aspirin Shortness Of Breath       No current facility-administered medications on file prior to encounter.      Current Outpatient Prescriptions on File Prior to Encounter   Medication Sig    trimethoprim (TRIMPEX) 100 mg Tab Take 100 mg by mouth once daily. Daily for 2 months, then off for 2  weeks, (scheduled to restart 4/12/17)    darifenacin (ENABLEX) 15 mg 24 hr tablet Take 15 mg by mouth once daily.    dorzolamide-timolol 2-0.5% (COSOPT) 22.3-6.8 mg/mL ophthalmic solution Place 1 drop into both eyes 2 (two) times daily.    EYE DROPS DISPENSER MISC Place 1 drop into both eyes 2 (two) times daily.     simvastatin (ZOCOR) 40 MG tablet Take 40 mg by mouth every evening.     Family History     None        Social History Main Topics    Smoking status: Never Smoker    Smokeless tobacco: Not on file    Alcohol use No    Drug use: No    Sexual activity: Not on file     Review of Systems   Constitutional: Positive for activity change and fatigue. Negative for appetite change and fever.   HENT: Negative for congestion, ear pain, rhinorrhea and sinus pressure.    Eyes: Negative for pain and discharge.   Respiratory: Negative for cough, chest tightness, shortness of breath and wheezing.    Cardiovascular: Negative for chest pain and leg swelling.   Gastrointestinal: Negative for abdominal distention, abdominal pain, diarrhea, nausea and vomiting.   Endocrine: Negative for cold intolerance and heat intolerance.   Genitourinary: Positive for dysuria and flank pain. Negative for difficulty urinating, frequency, hematuria and urgency.   Musculoskeletal: Positive for myalgias. Negative for arthralgias and joint swelling.   Allergic/Immunologic: Negative for environmental allergies and food allergies.   Neurological: Positive for weakness. Negative for dizziness, light-headedness and headaches.   Hematological: Does not bruise/bleed easily.   Psychiatric/Behavioral: Negative for agitation, behavioral problems and decreased concentration.     Objective:     Vital Signs (Most Recent):  Temp: 98.4 °F (36.9 °C) (03/14/18 2000)  Pulse: 68 (03/14/18 2232)  Resp: 16 (03/14/18 2100)  BP: 124/63 (03/14/18 2047)  SpO2: 96 % (03/14/18 2232) Vital Signs (24h Range):  Temp:  [97.6 °F (36.4 °C)-100.9 °F (38.3 °C)] 98.4 °F  (36.9 °C)  Pulse:  [] 68  Resp:  [16] 16  SpO2:  [93 %-99 %] 96 %  BP: (124-164)/() 124/63     Weight: 72.4 kg (159 lb 9.8 oz)  Body mass index is 27.4 kg/m².    Physical Exam   Constitutional: She is oriented to person, place, and time. She appears well-developed and well-nourished.   HENT:   Head: Normocephalic.   Eyes: Conjunctivae are normal. Right eye exhibits no discharge. Left eye exhibits no discharge.   Neck: Normal range of motion. Neck supple.   Cardiovascular: Normal rate, regular rhythm, normal heart sounds and intact distal pulses.    Pulses:       Radial pulses are 1+ on the right side, and 1+ on the left side.        Dorsalis pedis pulses are 1+ on the right side, and 1+ on the left side.   Pulmonary/Chest: Effort normal. No respiratory distress. She has decreased breath sounds in the right lower field and the left lower field.   Abdominal: Soft. She exhibits no distension. Bowel sounds are decreased. There is no tenderness.   Musculoskeletal: Normal range of motion.   Neurological: She is alert and oriented to person, place, and time. She has normal strength. GCS eye subscore is 4. GCS verbal subscore is 5. GCS motor subscore is 6.   Skin: Skin is warm and dry. Capillary refill takes 2 to 3 seconds.   Psychiatric: She has a normal mood and affect. Her speech is normal and behavior is normal. Thought content normal.           Significant Labs:   CBC:   Recent Labs  Lab 03/14/18  1813   WBC 12.30   HGB 13.3   HCT 41.1        CMP:   Recent Labs  Lab 03/14/18  1813      K 3.7      CO2 20*   *   BUN 17   CREATININE 0.7   CALCIUM 9.3   PROT 7.3   ALBUMIN 3.6   BILITOT 0.9   ALKPHOS 60   AST 17   ALT 13   ANIONGAP 12   EGFRNONAA >60     Urine Studies:   Recent Labs  Lab 03/14/18  1940   COLORU Yellow   APPEARANCEUA Hazy*   PHUR 6.0   SPECGRAV 1.025   PROTEINUA 2+*   GLUCUA Negative   KETONESU 1+*   BILIRUBINUA Negative   OCCULTUA 2+*   NITRITE Positive*   UROBILINOGEN  Negative   LEUKOCYTESUR 2+*   RBCUA 5*   WBCUA >100*   BACTERIA Many*   HYALINECASTS 0       Significant Imaging: I have reviewed all pertinent imaging results/findings within the past 24 hours.    Assessment/Plan:     * Urinary tract infection without hematuria    UA positive for bacteria, WBCs, positive nitrates and ketones. Lactic acid 1    Urine culture pending  Rocephin ordered            Right lower lobe pneumonia    CXR- Cardiac silhouette is normal in size.  Lungs are symmetrically expanded.  There is nonspecific diffuse interstitial prominence which may reflect chronic change versus mild interstitial edema.  Focal airspace opacity is seen within the right lower lung zone which could reflect developing infiltrate.  Future followup is recommended to ensure resolution    Will add Azithromycin to Rocephin to cover          HLD (hyperlipidemia)    Lipid Panel pending    Continue simvastatin            VTE Risk Mitigation         Ordered     heparin (porcine) injection 5,000 Units  Every 8 hours     Route:  Subcutaneous        03/14/18 2258     Place sequential compression device  Until discontinued      03/14/18 2258     Place GREGORIO hose  Until discontinued      03/14/18 2258     Medium Risk of VTE  Once      03/14/18 2258             Matthew Lin NP  Department of Hospital Medicine   [facility]

## 2018-03-15 NOTE — NURSING
Answered call from Ed nurse, requested to return call momentarily. Attempted to call back,  Nurse unavailable

## 2018-03-16 VITALS
DIASTOLIC BLOOD PRESSURE: 70 MMHG | HEART RATE: 78 BPM | TEMPERATURE: 97 F | WEIGHT: 159.63 LBS | SYSTOLIC BLOOD PRESSURE: 179 MMHG | BODY MASS INDEX: 27.25 KG/M2 | RESPIRATION RATE: 18 BRPM | HEIGHT: 64 IN | OXYGEN SATURATION: 92 %

## 2018-03-16 PROBLEM — N30.01 ACUTE CYSTITIS WITH HEMATURIA: Status: ACTIVE | Noted: 2018-03-14

## 2018-03-16 LAB
ALBUMIN SERPL BCP-MCNC: 2.9 G/DL
ALP SERPL-CCNC: 51 U/L
ALT SERPL W/O P-5'-P-CCNC: 10 U/L
ANION GAP SERPL CALC-SCNC: 11 MMOL/L
AST SERPL-CCNC: 15 U/L
BASOPHILS # BLD AUTO: 0.03 K/UL
BASOPHILS NFR BLD: 0.4 %
BILIRUB SERPL-MCNC: 0.4 MG/DL
BUN SERPL-MCNC: 13 MG/DL
CALCIUM SERPL-MCNC: 9.3 MG/DL
CHLORIDE SERPL-SCNC: 104 MMOL/L
CO2 SERPL-SCNC: 24 MMOL/L
CREAT SERPL-MCNC: 0.7 MG/DL
DIFFERENTIAL METHOD: NORMAL
EOSINOPHIL # BLD AUTO: 0.2 K/UL
EOSINOPHIL NFR BLD: 2.4 %
ERYTHROCYTE [DISTWIDTH] IN BLOOD BY AUTOMATED COUNT: 13.3 %
EST. GFR  (AFRICAN AMERICAN): >60 ML/MIN/1.73 M^2
EST. GFR  (NON AFRICAN AMERICAN): >60 ML/MIN/1.73 M^2
GLUCOSE SERPL-MCNC: 97 MG/DL
HCT VFR BLD AUTO: 37.4 %
HGB BLD-MCNC: 12.1 G/DL
LYMPHOCYTES # BLD AUTO: 1.5 K/UL
LYMPHOCYTES NFR BLD: 21.3 %
MAGNESIUM SERPL-MCNC: 2 MG/DL
MCH RBC QN AUTO: 29 PG
MCHC RBC AUTO-ENTMCNC: 32.4 G/DL
MCV RBC AUTO: 90 FL
MONOCYTES # BLD AUTO: 1 K/UL
MONOCYTES NFR BLD: 14.3 %
NEUTROPHILS # BLD AUTO: 4.2 K/UL
NEUTROPHILS NFR BLD: 60.7 %
PHOSPHATE SERPL-MCNC: 2.3 MG/DL
PLATELET # BLD AUTO: 203 K/UL
PMV BLD AUTO: 10 FL
POTASSIUM SERPL-SCNC: 3 MMOL/L
PROT SERPL-MCNC: 6.1 G/DL
RBC # BLD AUTO: 4.17 M/UL
SODIUM SERPL-SCNC: 139 MMOL/L
WBC # BLD AUTO: 6.99 K/UL

## 2018-03-16 PROCEDURE — 85025 COMPLETE CBC W/AUTO DIFF WBC: CPT

## 2018-03-16 PROCEDURE — 25000003 PHARM REV CODE 250: Performed by: HOSPITALIST

## 2018-03-16 PROCEDURE — 99217 PR OBSERVATION CARE DISCHARGE: CPT | Mod: ,,, | Performed by: HOSPITALIST

## 2018-03-16 PROCEDURE — 83735 ASSAY OF MAGNESIUM: CPT

## 2018-03-16 PROCEDURE — G0378 HOSPITAL OBSERVATION PER HR: HCPCS

## 2018-03-16 PROCEDURE — 80053 COMPREHEN METABOLIC PANEL: CPT

## 2018-03-16 PROCEDURE — 84100 ASSAY OF PHOSPHORUS: CPT

## 2018-03-16 PROCEDURE — 36415 COLL VENOUS BLD VENIPUNCTURE: CPT

## 2018-03-16 PROCEDURE — 63600175 PHARM REV CODE 636 W HCPCS: Performed by: HOSPITALIST

## 2018-03-16 PROCEDURE — 63600175 PHARM REV CODE 636 W HCPCS: Performed by: NURSE PRACTITIONER

## 2018-03-16 RX ORDER — TRIMETHOPRIM 100 MG/1
100 TABLET ORAL DAILY
Status: ON HOLD
Start: 2018-03-16 | End: 2019-08-28 | Stop reason: HOSPADM

## 2018-03-16 RX ORDER — NITROFURANTOIN 25; 75 MG/1; MG/1
100 CAPSULE ORAL 2 TIMES DAILY
Qty: 10 CAPSULE | Refills: 0 | Status: SHIPPED | OUTPATIENT
Start: 2018-03-17 | End: 2018-03-22

## 2018-03-16 RX ORDER — POTASSIUM CHLORIDE 20 MEQ/1
40 TABLET, EXTENDED RELEASE ORAL
Status: DISPENSED | OUTPATIENT
Start: 2018-03-16 | End: 2018-03-16

## 2018-03-16 RX ADMIN — DORZOLAMIDE HYDROCHLORIDE AND TIMOLOL MALEATE 1 DROP: 20; 5 SOLUTION/ DROPS OPHTHALMIC at 10:03

## 2018-03-16 RX ADMIN — CEFTRIAXONE 1 G: 1 INJECTION, SOLUTION INTRAVENOUS at 10:03

## 2018-03-16 RX ADMIN — POTASSIUM CHLORIDE 40 MEQ: 1500 TABLET, EXTENDED RELEASE ORAL at 10:03

## 2018-03-16 RX ADMIN — HEPARIN SODIUM 5000 UNITS: 5000 INJECTION, SOLUTION INTRAVENOUS; SUBCUTANEOUS at 06:03

## 2018-03-16 NOTE — ASSESSMENT & PLAN NOTE
- UA positive for bacteria, WBCs, positive nitrates and ketones. Lactic acid 1  - Urine culture pending  - Continue Rocephin

## 2018-03-16 NOTE — NURSING
Chief complaint:  Botulinum Toxin injection with Botox, units to be injected today: 200    Diagnosis: Spasm of muscle: Piriformis Syndrome    Muscles identified for injection today:  At Left side:  Units injected:  Piriformis  200    Note: EMG guidance was utilized.              Procedure:    A 'time out' was applied to identify the patient by name and date of birth.  The risks and benefits of the procedure were explained to the patient.   Informed consent included the review of prior tried measures, and their failure, and hence utilizing Botulinum Toxin to best achieve the goals of the treatment.    Adverse events, side effects were explained in detail, which included muscle weakness, pain, flu like symptoms, headaches, dry mouth, infection, septicemia, sudden death.  Additional FDA recommended warnings were also discussed, which included migration of toxin to other areas, leading to dysphagia or respiratory arrest.  The patient expressed understanding and verbalized agreement, and consented to the procedure.     The skin overlying the area of the injection was cleansed with alcohol and Botulinum Toxin was prepared with preservative-free normal saline and injected at the above mentioned muscles with EMG guidance:  The procedure was tolerated without difficulty.  Post procedure care and therapies for ROM was discussed.  A return appointment PRN in 6 weeks was suggested for TPI.    Many thanks for allowing us to participate in the care of your patient.    Benji Andres MD  Dept. of PM & R  Board Certified: American Board of PM & R.  Board Certified: American Board of Electrodiagnostic Medicine.  Board Certified: American Board of Neuromuscular diseases.  Board Certified: American Board of Brain Injury Medicine.   1935 - Pt's sitter at the bedside called the daughter, Tara, after being informed an order was recently put in for chest x-ray.  Spoke with Pt's daughter concerning ordered chest x-ray and daughter expressed concern that she was not contacted or included regarding further chest imaging.      Initially the daughter did not want proceed with the chest x-ray, but she called me back at approx. 1950 stating that she would like to proceed with the imaging.  Paged MD for additional clarification.  Will continue to monitor.

## 2018-03-16 NOTE — PROGRESS NOTES
[facility] Hospital Medicine  Progress Note    Patient Name: Cammy Brown  MRN: [id number]  Patient Class: OP- Observation   Admission Date: 3/14/2018  Length of Stay: 0 days  Attending Physician: Sangita Luo MD  Primary Care Provider: Domo Owens MD        Subjective:     Principal Problem:Urinary tract infection without hematuria    HPI:  Ms. Brown is a 89 year old woman who presented to the ED with sleepiness.  Her daughter who lives with her stated patient had been sleeping all day, and this is a typical presentation for her when she has a UTI.  Patient gets recurrent UTIs and is on a regimen of trimethoprim 2 weeks on and one week off, and currently is on day 7 of this regimen.  Her urologist is Dr. Reed at [facility].  At baseline patient is talkative and ambulates with a walker.  She denied any other symptoms such as fever or dysuria.  UA done in ED was positive for nitrite, bacteria and leukocytes.  Her temp was 100.9 and WBC 12.3K.    Hospital Course:  Patient was admitted on IV Rocephin to treat a presumed UTI.  She became alert by the following morning and felt at her baseline level.    Interval History:  No complaints.  She is a little despondent that she keeps getting UTIs recurrently.      Review of Systems   Constitutional: Positive for fever. Negative for chills.   Respiratory: Negative for cough and shortness of breath.    Cardiovascular: Negative for chest pain and palpitations.   Genitourinary: Negative for dysuria.     Objective:     Vital Signs (Most Recent):  Temp: 100 °F (37.8 °C) (03/15/18 1540)  Pulse: 60 (03/15/18 1540)  Resp: 18 (03/15/18 1540)  BP: (!) 172/83 (03/15/18 1540)  SpO2: (!) 94 % (03/15/18 1540) Vital Signs (24h Range):  Temp:  [98 °F (36.7 °C)-100.8 °F (38.2 °C)] 100 °F (37.8 °C)  Pulse:  [60-76] 60  Resp:  [16-18] 18  SpO2:  [90 %-97 %] 94 %  BP: (124-192)/() 172/83     Weight: 72.4 kg (159 lb 9.8 oz)  Body mass index is 27.4  kg/m².    Intake/Output Summary (Last 24 hours) at 03/15/18 1913  Last data filed at 03/15/18 0400   Gross per 24 hour   Intake          1586.67 ml   Output              400 ml   Net          1186.67 ml      Physical Exam   Constitutional: She is oriented to person, place, and time. She appears well-developed and well-nourished.   Elderly lady sitting on edge of bed eating breakfast.   HENT:   Head: Normocephalic.   Eyes: Conjunctivae are normal. Pupils are equal, round, and reactive to light.   Neck: Neck supple. No thyromegaly present.   Cardiovascular: Normal rate, regular rhythm, normal heart sounds and intact distal pulses.  Exam reveals no gallop and no friction rub.    No murmur heard.  Pulmonary/Chest: Effort normal and breath sounds normal.   Abdominal: Soft. Bowel sounds are normal. She exhibits no distension. There is no tenderness.   Musculoskeletal: Normal range of motion. She exhibits no edema.   Lymphadenopathy:     She has no cervical adenopathy.   Neurological: She is alert and oriented to person, place, and time.   Strength equal and symmetric   Skin: Skin is warm and dry. No rash noted.   Psychiatric: She has a normal mood and affect. Her behavior is normal. Thought content normal.       Significant Labs: All pertinent labs within the past 24 hours have been reviewed.    Significant Imaging: I have reviewed all pertinent imaging results/findings within the past 24 hours.    Assessment/Plan:      * Urinary tract infection without hematuria    - UA positive for bacteria, WBCs, positive nitrates and ketones. Lactic acid 1  - Urine culture pending  - Continue Rocephin         Right lower lobe pneumonia    - Possible infiltrate on CXR (portable).  - Clinically she does not have pneumonia - will get PA/lateral CXR.  - Continue azithromycin while awaiting results.        HLD (hyperlipidemia)    Lipid Panel pending    Continue simvastatin            VTE Risk Mitigation         Ordered     heparin  (porcine) injection 5,000 Units  Every 8 hours     Route:  Subcutaneous        03/14/18 2258     Place sequential compression device  Until discontinued      03/14/18 2258     Place GREGORIO hose  Until discontinued      03/14/18 2258     Medium Risk of VTE  Once      03/14/18 2258              Sangita Dumas MD  Department of Hospital Medicine   [facility]

## 2018-03-16 NOTE — ASSESSMENT & PLAN NOTE
- Possible infiltrate on CXR (portable).  - Clinically she does not have pneumonia - will get PA/lateral CXR.  - Continue azithromycin while awaiting results.

## 2018-03-16 NOTE — HOSPITAL COURSE
Patient was admitted on IV Rocephin while awaiting culture and sensitivities.  She became alert by the following morning and felt at her baseline level.  The urine culture did not result until the following morning and showed >100K gram negative rods.  Based on sensitivity results for her previous cultures, nitrofurantoin was chosen for an oral antibiotic.  She might consider drinking daily cranberry juice or take cranberry capsules to help prevent bacteriuria.      Portable CXR taken in the ED showed a possible developing PNA, but when this was repeated as a PA/lateral film it appeared to be atelectasis.  Follow up with PCP within the next couple of weeks to make sure this remains stable.

## 2018-03-16 NOTE — DISCHARGE SUMMARY
[facility]  Hospital Medicine  Discharge Summary      Patient Name: Cammy Brown  MRN: [id number]  Admission Date: 3/14/2018  Hospital Length of Stay: 0 days  Discharge Date and Time:  03/16/2018 10:09 AM  Attending Physician: Sangita Luo MD   Discharging Provider: Sangita Luo MD  Primary Care Provider: Domo Owens MD      HPI:   Ms. Brown is a 89 year old woman who presented to the ED with sleepiness and lethargy.  Her daughter who lives with her stated patient had been sleeping all day, and this is a typical presentation for her when she has a UTI.  Patient gets recurrent UTIs and is on a regimen of trimethoprim 2 weeks on and one week off, and currently is on day 7 of this regimen.  Her urologist is Dr. Reed at [facility].  At baseline patient is talkative and ambulates with a walker.  She denied any other symptoms such as fever or dysuria.  UA done in ED was positive for nitrite, bacteria and leukocytes.  Her temp was 100.9 on presentation and WBC 12.3K.          Hospital Course:   Patient was admitted on IV Rocephin while awaiting culture and sensitivities.  She became alert by the following morning and felt at her baseline level.  The urine culture did not result until the following morning and showed >100K gram negative rods.  Based on sensitivity results for her previous cultures, nitrofurantoin was chosen for an oral antibiotic.  She might consider drinking daily cranberry juice or take cranberry capsules to help prevent bacteriuria.      Portable CXR taken in the ED showed a possible developing PNA, but when this was repeated as a PA/lateral film it appeared to be atelectasis.  Follow up with PCP within the next couple of weeks to make sure this remains stable.           Final Active Diagnoses:    Diagnosis Date Noted POA    PRINCIPAL PROBLEM:  Acute cystitis with hematuria [N30.01] 03/14/2018 Yes      Problems Resolved During this Admission:    Diagnosis Date  Noted Date Resolved POA       Discharged Condition: stable    Disposition:     Follow Up:  Follow-up Information     Domo Owens MD.    Specialty:  Internal Medicine  Why:  Follow up in 1-2 weeks  Contact information:  [address]  [address]  [place] [address] [phone]             Chuck Reed MD.    Specialty:  Urology  Why:  Follow up for the next available appointment  Contact information:  [address]  [address]  [place] [address] [phone]                 Patient Instructions:     Diet Adult Regular     Activity as tolerated         Medications:  Reconciled Home Medications:   Current Discharge Medication List      START taking these medications    Details   nitrofurantoin, macrocrystal-monohydrate, (MACROBID) 100 MG capsule Take 1 capsule (100 mg total) by mouth 2 (two) times daily.  Qty: 10 capsule, Refills: 0         CONTINUE these medications which have CHANGED    Details   trimethoprim (TRIMPEX) 100 mg Tab Take 1 tablet (100 mg total) by mouth once daily. Daily for 2 months, then off for 2 weeks, (scheduled to restart 4/12/17)     Hold while on nitrofurantoin.         CONTINUE these medications which have NOT CHANGED    Details   darifenacin (ENABLEX) 15 mg 24 hr tablet Take 15 mg by mouth once daily.      dorzolamide-timolol 2-0.5% (COSOPT) 22.3-6.8 mg/mL ophthalmic solution Place 1 drop into both eyes 2 (two) times daily.      EYE DROPS DISPENSER MISC Place 1 drop into both eyes 2 (two) times daily.       simvastatin (ZOCOR) 40 MG tablet Take 40 mg by mouth every evening.               Time spent on the discharge of patient: <30 minutes  Patient was seen and examined on the date of discharge and determined to be suitable for discharge.         Sangita Dumas MD  Department of Hospital Medicine  [facility]

## 2018-03-16 NOTE — NURSING
No significant events overnight. Remains free from fall, injury, and skin breakdown. Voiding via restroom; ambulates with walker and standby assist. VSS on RA throughout the night. Denies pain. IV Abx administered. TEDs/SCDs in place. Plan of care reviewed with patient and all questions answered. Bed low, locked w/ bed alarm on. Call light within reach. Purposeful rounding performed. Resting comfortably in bed, no other complaints at this time.

## 2018-03-16 NOTE — SUBJECTIVE & OBJECTIVE
Interval History:  No complaints.  She is a little despondent that she keeps getting UTIs recurrently.      Review of Systems   Constitutional: Positive for fever. Negative for chills.   Respiratory: Negative for cough and shortness of breath.    Cardiovascular: Negative for chest pain and palpitations.   Genitourinary: Negative for dysuria.     Objective:     Vital Signs (Most Recent):  Temp: 100 °F (37.8 °C) (03/15/18 1540)  Pulse: 60 (03/15/18 1540)  Resp: 18 (03/15/18 1540)  BP: (!) 172/83 (03/15/18 1540)  SpO2: (!) 94 % (03/15/18 1540) Vital Signs (24h Range):  Temp:  [98 °F (36.7 °C)-100.8 °F (38.2 °C)] 100 °F (37.8 °C)  Pulse:  [60-76] 60  Resp:  [16-18] 18  SpO2:  [90 %-97 %] 94 %  BP: (124-192)/() 172/83     Weight: 72.4 kg (159 lb 9.8 oz)  Body mass index is 27.4 kg/m².    Intake/Output Summary (Last 24 hours) at 03/15/18 1913  Last data filed at 03/15/18 0400   Gross per 24 hour   Intake          1586.67 ml   Output              400 ml   Net          1186.67 ml      Physical Exam   Constitutional: She is oriented to person, place, and time. She appears well-developed and well-nourished.   Elderly lady sitting on edge of bed eating breakfast.   HENT:   Head: Normocephalic.   Eyes: Conjunctivae are normal. Pupils are equal, round, and reactive to light.   Neck: Neck supple. No thyromegaly present.   Cardiovascular: Normal rate, regular rhythm, normal heart sounds and intact distal pulses.  Exam reveals no gallop and no friction rub.    No murmur heard.  Pulmonary/Chest: Effort normal and breath sounds normal.   Abdominal: Soft. Bowel sounds are normal. She exhibits no distension. There is no tenderness.   Musculoskeletal: Normal range of motion. She exhibits no edema.   Lymphadenopathy:     She has no cervical adenopathy.   Neurological: She is alert and oriented to person, place, and time.   Strength equal and symmetric   Skin: Skin is warm and dry. No rash noted.   Psychiatric: She has a normal mood  and affect. Her behavior is normal. Thought content normal.       Significant Labs: All pertinent labs within the past 24 hours have been reviewed.    Significant Imaging: I have reviewed all pertinent imaging results/findings within the past 24 hours.

## 2018-03-17 LAB — BACTERIA UR CULT: NORMAL

## 2018-03-20 LAB
BACTERIA BLD CULT: NORMAL
BACTERIA BLD CULT: NORMAL

## 2019-03-17 ENCOUNTER — HOSPITAL ENCOUNTER (EMERGENCY)
Facility: OTHER | Age: 84
Discharge: HOME | End: 2019-03-17
Attending: EMERGENCY MEDICINE
Payer: MEDICARE

## 2019-03-17 VITALS
WEIGHT: 178 LBS | HEIGHT: 65 IN | HEART RATE: 68 BPM | TEMPERATURE: 99 F | RESPIRATION RATE: 20 BRPM | DIASTOLIC BLOOD PRESSURE: 94 MMHG | SYSTOLIC BLOOD PRESSURE: 133 MMHG | BODY MASS INDEX: 29.66 KG/M2 | OXYGEN SATURATION: 95 %

## 2019-03-17 DIAGNOSIS — N39.0 URINARY TRACT INFECTION WITHOUT HEMATURIA, SITE UNSPECIFIED: Primary | ICD-10-CM

## 2019-03-17 LAB
ALBUMIN SERPL BCP-MCNC: 2.9 G/DL
ALP SERPL-CCNC: 68 U/L
ALT SERPL W/O P-5'-P-CCNC: 9 U/L
ANION GAP SERPL CALC-SCNC: 10 MMOL/L
AST SERPL-CCNC: 12 U/L
BACTERIA #/AREA URNS HPF: ABNORMAL /HPF
BASOPHILS # BLD AUTO: 0.05 K/UL
BASOPHILS NFR BLD: 0.6 %
BILIRUB SERPL-MCNC: 0.3 MG/DL
BILIRUB UR QL STRIP: NEGATIVE
BUN SERPL-MCNC: 20 MG/DL
CALCIUM SERPL-MCNC: 9.3 MG/DL
CHLORIDE SERPL-SCNC: 103 MMOL/L
CLARITY UR: ABNORMAL
CO2 SERPL-SCNC: 25 MMOL/L
COLOR UR: YELLOW
CREAT SERPL-MCNC: 0.9 MG/DL
DIFFERENTIAL METHOD: ABNORMAL
EOSINOPHIL # BLD AUTO: 0.2 K/UL
EOSINOPHIL NFR BLD: 2.6 %
ERYTHROCYTE [DISTWIDTH] IN BLOOD BY AUTOMATED COUNT: 13.5 %
EST. GFR  (AFRICAN AMERICAN): >60 ML/MIN/1.73 M^2
EST. GFR  (NON AFRICAN AMERICAN): 55 ML/MIN/1.73 M^2
GLUCOSE SERPL-MCNC: 128 MG/DL
GLUCOSE UR QL STRIP: NEGATIVE
HCT VFR BLD AUTO: 33.5 %
HGB BLD-MCNC: 10.6 G/DL
HGB UR QL STRIP: ABNORMAL
HYALINE CASTS #/AREA URNS LPF: 0 /LPF
KETONES UR QL STRIP: NEGATIVE
LACTATE SERPL-SCNC: 1.2 MMOL/L
LEUKOCYTE ESTERASE UR QL STRIP: ABNORMAL
LYMPHOCYTES # BLD AUTO: 1.1 K/UL
LYMPHOCYTES NFR BLD: 12.5 %
MAGNESIUM SERPL-MCNC: 2.1 MG/DL
MCH RBC QN AUTO: 26.9 PG
MCHC RBC AUTO-ENTMCNC: 31.6 G/DL
MCV RBC AUTO: 85 FL
MICROSCOPIC COMMENT: ABNORMAL
MONOCYTES # BLD AUTO: 1.1 K/UL
MONOCYTES NFR BLD: 12.6 %
NEUTROPHILS # BLD AUTO: 6.3 K/UL
NEUTROPHILS NFR BLD: 70.4 %
NITRITE UR QL STRIP: POSITIVE
PH UR STRIP: 6 [PH] (ref 5–8)
PHOSPHATE SERPL-MCNC: 3.6 MG/DL
PLATELET # BLD AUTO: 314 K/UL
PMV BLD AUTO: 9.9 FL
POTASSIUM SERPL-SCNC: 3.6 MMOL/L
PROT SERPL-MCNC: 6.7 G/DL
PROT UR QL STRIP: ABNORMAL
RBC # BLD AUTO: 3.94 M/UL
RBC #/AREA URNS HPF: 4 /HPF (ref 0–4)
SODIUM SERPL-SCNC: 138 MMOL/L
SP GR UR STRIP: >=1.03 (ref 1–1.03)
SQUAMOUS #/AREA URNS HPF: 5 /HPF
URN SPEC COLLECT METH UR: ABNORMAL
UROBILINOGEN UR STRIP-ACNC: NEGATIVE EU/DL
WBC # BLD AUTO: 8.99 K/UL
WBC #/AREA URNS HPF: 70 /HPF (ref 0–5)
WBC CLUMPS URNS QL MICRO: ABNORMAL

## 2019-03-17 PROCEDURE — 99285 EMERGENCY DEPT VISIT HI MDM: CPT | Mod: 25

## 2019-03-17 PROCEDURE — 87077 CULTURE AEROBIC IDENTIFY: CPT

## 2019-03-17 PROCEDURE — 81000 URINALYSIS NONAUTO W/SCOPE: CPT

## 2019-03-17 PROCEDURE — 87186 SC STD MICRODIL/AGAR DIL: CPT

## 2019-03-17 PROCEDURE — 84100 ASSAY OF PHOSPHORUS: CPT

## 2019-03-17 PROCEDURE — 83735 ASSAY OF MAGNESIUM: CPT

## 2019-03-17 PROCEDURE — 93010 ELECTROCARDIOGRAM REPORT: CPT | Mod: ,,, | Performed by: INTERNAL MEDICINE

## 2019-03-17 PROCEDURE — 93005 ELECTROCARDIOGRAM TRACING: CPT

## 2019-03-17 PROCEDURE — 87088 URINE BACTERIA CULTURE: CPT

## 2019-03-17 PROCEDURE — 85025 COMPLETE CBC W/AUTO DIFF WBC: CPT

## 2019-03-17 PROCEDURE — 80053 COMPREHEN METABOLIC PANEL: CPT

## 2019-03-17 PROCEDURE — 87086 URINE CULTURE/COLONY COUNT: CPT

## 2019-03-17 PROCEDURE — 25000003 PHARM REV CODE 250: Performed by: EMERGENCY MEDICINE

## 2019-03-17 PROCEDURE — 93010 EKG 12-LEAD: ICD-10-PCS | Mod: ,,, | Performed by: INTERNAL MEDICINE

## 2019-03-17 PROCEDURE — 83605 ASSAY OF LACTIC ACID: CPT

## 2019-03-17 RX ORDER — CEPHALEXIN 500 MG/1
500 CAPSULE ORAL
Status: COMPLETED | OUTPATIENT
Start: 2019-03-17 | End: 2019-03-17

## 2019-03-17 RX ORDER — CEPHALEXIN 250 MG/1
250 CAPSULE ORAL EVERY 8 HOURS
Qty: 21 CAPSULE | Refills: 0 | Status: SHIPPED | OUTPATIENT
Start: 2019-03-17 | End: 2019-03-24

## 2019-03-17 RX ADMIN — CEPHALEXIN 500 MG: 500 CAPSULE ORAL at 11:03

## 2019-03-18 NOTE — ED PROVIDER NOTES
"Encounter Date: 3/17/2019    SCRIBE #1 NOTE: I, Kaye Bailon, am scribing for, and in the presence of, Dr. Santacruz.       History     Chief Complaint   Patient presents with    Fatigue     Pt daughter states "she has the ususl symptoms of when she geta a UTI, lethargy, and weakness"     Time seen by provider: 9:20 PM    This is a 89 y.o. female with hx of HTN and frequent UTI's who presents with complaint of fatigue.  Her daughter reports, she is acting exactly how she does when she gets a UTI. Daughter reports fever of 100 degrees, chronic SOB on exertion, brief right chest pain that lasted for 1 to 2 seconds earlier tonight, and current mild headache. She denies congestion, rhinorrhea, cough, leg swelling, or leg pain. Her mental status appears to be at baseline as per daughter. She lives with her daughter, who has recently been out of the country. Daughter reports her caregiver has increased her sugar intake. She denies undergoing any recent surgeries.      The history is provided by the patient and a relative.     Review of patient's allergies indicates:   Allergen Reactions    Aspirin Shortness Of Breath     Past Medical History:   Diagnosis Date    Hyperlipemia     UTI (urinary tract infection)      Past Surgical History:   Procedure Laterality Date    CHOLECYSTECTOMY      HEMORRHOID SURGERY      HERNIA REPAIR      HYSTERECTOMY      KNEE SURGERY       No family history on file.  Social History     Tobacco Use    Smoking status: Never Smoker   Substance Use Topics    Alcohol use: No    Drug use: No     Review of Systems   Constitutional: Positive for fatigue and fever. Negative for chills.   HENT: Negative for congestion and sore throat.    Eyes: Negative for visual disturbance.   Respiratory: Positive for shortness of breath. Negative for cough.    Cardiovascular: Positive for chest pain. Negative for palpitations and leg swelling.   Gastrointestinal: Negative for abdominal pain, diarrhea and " vomiting.   Genitourinary: Negative for decreased urine volume, dysuria and vaginal discharge.   Musculoskeletal: Negative for joint swelling, neck pain and neck stiffness.        Negative for leg pain.   Skin: Negative for rash and wound.   Neurological: Positive for headaches. Negative for weakness and numbness.   Psychiatric/Behavioral: Negative for confusion.       Physical Exam     Initial Vitals [03/17/19 2020]   BP Pulse Resp Temp SpO2   138/70 82 18 98.9 °F (37.2 °C) 95 %      MAP       --         Physical Exam    Nursing note and vitals reviewed.  Constitutional: She appears well-developed and well-nourished. She is not diaphoretic. No distress.   HENT:   Head: Normocephalic and atraumatic.   Eyes: Conjunctivae and EOM are normal. Pupils are equal, round, and reactive to light. No scleral icterus.   Neck: Normal range of motion. Neck supple.   Cardiovascular: Normal rate, regular rhythm and normal heart sounds. Exam reveals no gallop and no friction rub.    No murmur heard.  Pulmonary/Chest: Breath sounds normal. No respiratory distress. She has no wheezes. She has no rhonchi. She has no rales.   Abdominal: Soft. Bowel sounds are increased. There is no tenderness. There is no rebound and no guarding.   Musculoskeletal: Normal range of motion. She exhibits no edema or tenderness.   Neurological: She is alert and oriented to person, place, and time. She has normal strength. No cranial nerve deficit. GCS score is 15. GCS eye subscore is 4. GCS verbal subscore is 5. GCS motor subscore is 6.   She is able to correctly state the month, year, and name the president.   Skin: Skin is warm and dry.   Psychiatric: She has a normal mood and affect. Thought content normal.         ED Course   Procedures  Labs Reviewed   CBC W/ AUTO DIFFERENTIAL - Abnormal; Notable for the following components:       Result Value    RBC 3.94 (*)     Hemoglobin 10.6 (*)     Hematocrit 33.5 (*)     MCH 26.9 (*)     MCHC 31.6 (*)     Mono #  1.1 (*)     Lymph% 12.5 (*)     All other components within normal limits   COMPREHENSIVE METABOLIC PANEL - Abnormal; Notable for the following components:    Glucose 128 (*)     Albumin 2.9 (*)     ALT 9 (*)     eGFR if non  55 (*)     All other components within normal limits   URINALYSIS, REFLEX TO URINE CULTURE - Abnormal; Notable for the following components:    Appearance, UA Cloudy (*)     Specific Gravity, UA >=1.030 (*)     Protein, UA 2+ (*)     Occult Blood UA 2+ (*)     Nitrite, UA Positive (*)     Leukocytes, UA 2+ (*)     All other components within normal limits    Narrative:     Preferred Collection Type->Urine, Catheterized   URINALYSIS MICROSCOPIC - Abnormal; Notable for the following components:    WBC, UA 70 (*)     WBC Clumps, UA Moderate (*)     Bacteria, UA Moderate (*)     All other components within normal limits    Narrative:     Preferred Collection Type->Urine, Catheterized   CULTURE, URINE   LACTIC ACID, PLASMA   MAGNESIUM   PHOSPHORUS          Imaging Results          X-Ray Chest AP Portable (Final result)  Result time 03/17/19 21:46:50    Final result by Yanni Cabrera MD (03/17/19 21:46:50)                 Impression:      No significant change.      Electronically signed by: Yanni Cabrera MD  Date:    03/17/2019  Time:    21:46             Narrative:    EXAMINATION:  XR CHEST AP PORTABLE    CLINICAL HISTORY:  Sepsis;    TECHNIQUE:  Single frontal view of the chest was performed.    COMPARISON:  March 2018.    FINDINGS:  Heart is enlarged but stable in size.  Lungs are hypoinflated which accentuates pulmonary vascular markings.  No evidence of new focal consolidative process, pneumothorax, or large effusion.  No acute osseous abnormality identified.                              X-Rays:   Independently Interpreted Readings:   Chest X-Ray: Study limited due to patient rotation. Poor inspiratory effort. No obvious infiltrate, effusion, or pneumothorax.     Medical  Decision Making:   Independently Interpreted Test(s):   I have ordered and independently interpreted X-rays - see prior notes.  Clinical Tests:   Lab Tests: Ordered and Reviewed  Radiological Study: Ordered and Reviewed  Medical Tests: Ordered and Reviewed  ED Management:  Emergent evaluation a 89-year-old female who presents with complaint of fatigue.  Her daughter associates this with frequent UTIs.  She also complained of a brief episode of chest pain which is now resolved and some chronic shortness of breath. Vital signs are benign, afebrile.  Physical exam is benign.  Sepsis workup is negative, no leukocytosis or elevated lactic acid.  Chest workup is negative, no EKG changes and normal chest x-ray, I do not suspect acute MI.  The patient does however have a UTI with 70 WBCs per high-powered field.  I reviewed previous microbiology and culture results, and discussed possible antibiotics with her daughter.  Will start with Keflex.  She is given a dose here and given prescription for Keflex.  Will follow the culture.  She is encouraged close follow-up with PCP or to return here for new or worsening symptoms.            Scribe Attestation:   Scribe #1: I performed the above scribed service and the documentation accurately describes the services I performed. I attest to the accuracy of the note.    Attending Attestation:           Physician Attestation for Scribe:  Physician Attestation Statement for Scribe #1: I, Dr. Santacruz, reviewed documentation, as scribed by Kaye Bailon in my presence, and it is both accurate and complete.                    Clinical Impression:     1. Urinary tract infection without hematuria, site unspecified                                 Radha Santacruz MD  03/17/19 1252

## 2019-03-20 LAB — BACTERIA UR CULT: NORMAL

## 2019-08-26 ENCOUNTER — HOSPITAL ENCOUNTER (INPATIENT)
Facility: OTHER | Age: 84
LOS: 3 days | Discharge: HOSPICE | DRG: 374 | End: 2019-08-29
Attending: EMERGENCY MEDICINE | Admitting: INTERNAL MEDICINE
Payer: MEDICARE

## 2019-08-26 DIAGNOSIS — R16.0 LIVER MASS: ICD-10-CM

## 2019-08-26 DIAGNOSIS — Z71.89 GOALS OF CARE, COUNSELING/DISCUSSION: ICD-10-CM

## 2019-08-26 DIAGNOSIS — R10.813 RIGHT LOWER QUADRANT ABDOMINAL TENDERNESS WITHOUT REBOUND TENDERNESS: ICD-10-CM

## 2019-08-26 DIAGNOSIS — K66.8 PNEUMOPERITONEUM: ICD-10-CM

## 2019-08-26 DIAGNOSIS — D50.9 MICROCYTIC ANEMIA: Chronic | ICD-10-CM

## 2019-08-26 DIAGNOSIS — C78.7 LIVER METASTASES: ICD-10-CM

## 2019-08-26 DIAGNOSIS — K35.32 CECUM PERFORATION: Primary | ICD-10-CM

## 2019-08-26 DIAGNOSIS — N39.0 URINARY TRACT INFECTION WITHOUT HEMATURIA, SITE UNSPECIFIED: ICD-10-CM

## 2019-08-26 DIAGNOSIS — K63.89 COLONIC MASS: ICD-10-CM

## 2019-08-26 PROBLEM — N30.01 ACUTE CYSTITIS WITH HEMATURIA: Status: RESOLVED | Noted: 2018-03-14 | Resolved: 2019-08-26

## 2019-08-26 PROBLEM — K63.1 PERFORATION OF COLON: Status: ACTIVE | Noted: 2019-08-26

## 2019-08-26 LAB
ALBUMIN SERPL BCP-MCNC: 2.9 G/DL (ref 3.5–5.2)
ALP SERPL-CCNC: 97 U/L (ref 55–135)
ALT SERPL W/O P-5'-P-CCNC: 11 U/L (ref 10–44)
ANION GAP SERPL CALC-SCNC: 9 MMOL/L (ref 8–16)
AST SERPL-CCNC: 16 U/L (ref 10–40)
BACTERIA #/AREA URNS HPF: ABNORMAL /HPF
BASOPHILS # BLD AUTO: 0.08 K/UL (ref 0–0.2)
BASOPHILS NFR BLD: 0.7 % (ref 0–1.9)
BILIRUB SERPL-MCNC: 0.5 MG/DL (ref 0.1–1)
BILIRUB UR QL STRIP: NEGATIVE
BUN SERPL-MCNC: 13 MG/DL (ref 10–30)
CALCIUM SERPL-MCNC: 8.7 MG/DL (ref 8.7–10.5)
CHLORIDE SERPL-SCNC: 102 MMOL/L (ref 95–110)
CLARITY UR: ABNORMAL
CO2 SERPL-SCNC: 24 MMOL/L (ref 23–29)
COLOR UR: YELLOW
CREAT SERPL-MCNC: 0.7 MG/DL (ref 0.5–1.4)
DIFFERENTIAL METHOD: ABNORMAL
EOSINOPHIL # BLD AUTO: 0.3 K/UL (ref 0–0.5)
EOSINOPHIL NFR BLD: 2.9 % (ref 0–8)
ERYTHROCYTE [DISTWIDTH] IN BLOOD BY AUTOMATED COUNT: 16.8 % (ref 11.5–14.5)
EST. GFR  (AFRICAN AMERICAN): >60 ML/MIN/1.73 M^2
EST. GFR  (NON AFRICAN AMERICAN): >60 ML/MIN/1.73 M^2
GLUCOSE SERPL-MCNC: 123 MG/DL (ref 70–110)
GLUCOSE UR QL STRIP: NEGATIVE
HCT VFR BLD AUTO: 31 % (ref 37–48.5)
HGB BLD-MCNC: 9.2 G/DL (ref 12–16)
HGB UR QL STRIP: NEGATIVE
IMM GRANULOCYTES # BLD AUTO: 0.09 K/UL (ref 0–0.04)
IMM GRANULOCYTES NFR BLD AUTO: 0.8 % (ref 0–0.5)
KETONES UR QL STRIP: NEGATIVE
LACTATE SERPL-SCNC: 0.8 MMOL/L (ref 0.5–2.2)
LEUKOCYTE ESTERASE UR QL STRIP: ABNORMAL
LIPASE SERPL-CCNC: 7 U/L (ref 4–60)
LYMPHOCYTES # BLD AUTO: 1.1 K/UL (ref 1–4.8)
LYMPHOCYTES NFR BLD: 9.2 % (ref 18–48)
MCH RBC QN AUTO: 21.5 PG (ref 27–31)
MCHC RBC AUTO-ENTMCNC: 29.7 G/DL (ref 32–36)
MCV RBC AUTO: 73 FL (ref 82–98)
MICROSCOPIC COMMENT: ABNORMAL
MONOCYTES # BLD AUTO: 0.8 K/UL (ref 0.3–1)
MONOCYTES NFR BLD: 6.7 % (ref 4–15)
NEUTROPHILS # BLD AUTO: 9.2 K/UL (ref 1.8–7.7)
NEUTROPHILS NFR BLD: 79.7 % (ref 38–73)
NITRITE UR QL STRIP: POSITIVE
NRBC BLD-RTO: 0 /100 WBC
PH UR STRIP: 6 [PH] (ref 5–8)
PLATELET # BLD AUTO: 422 K/UL (ref 150–350)
PMV BLD AUTO: 9.9 FL (ref 9.2–12.9)
POTASSIUM SERPL-SCNC: 3.8 MMOL/L (ref 3.5–5.1)
PROT SERPL-MCNC: 6.2 G/DL (ref 6–8.4)
PROT UR QL STRIP: NEGATIVE
RBC # BLD AUTO: 4.27 M/UL (ref 4–5.4)
SODIUM SERPL-SCNC: 135 MMOL/L (ref 136–145)
SP GR UR STRIP: <=1.005 (ref 1–1.03)
SQUAMOUS #/AREA URNS HPF: 9 /HPF
URN SPEC COLLECT METH UR: ABNORMAL
UROBILINOGEN UR STRIP-ACNC: NEGATIVE EU/DL
WBC # BLD AUTO: 11.55 K/UL (ref 3.9–12.7)
WBC #/AREA URNS HPF: 11 /HPF (ref 0–5)
WBC CLUMPS URNS QL MICRO: ABNORMAL

## 2019-08-26 PROCEDURE — 99285 EMERGENCY DEPT VISIT HI MDM: CPT | Mod: 25

## 2019-08-26 PROCEDURE — 11000001 HC ACUTE MED/SURG PRIVATE ROOM

## 2019-08-26 PROCEDURE — 27000221 HC OXYGEN, UP TO 24 HOURS

## 2019-08-26 PROCEDURE — 81000 URINALYSIS NONAUTO W/SCOPE: CPT

## 2019-08-26 PROCEDURE — 96375 TX/PRO/DX INJ NEW DRUG ADDON: CPT

## 2019-08-26 PROCEDURE — 94761 N-INVAS EAR/PLS OXIMETRY MLT: CPT

## 2019-08-26 PROCEDURE — 99223 1ST HOSP IP/OBS HIGH 75: CPT | Mod: AI,,, | Performed by: INTERNAL MEDICINE

## 2019-08-26 PROCEDURE — 96376 TX/PRO/DX INJ SAME DRUG ADON: CPT

## 2019-08-26 PROCEDURE — P9612 CATHETERIZE FOR URINE SPEC: HCPCS

## 2019-08-26 PROCEDURE — 87088 URINE BACTERIA CULTURE: CPT

## 2019-08-26 PROCEDURE — 99223 PR INITIAL HOSPITAL CARE,LEVL III: ICD-10-PCS | Mod: AI,,, | Performed by: INTERNAL MEDICINE

## 2019-08-26 PROCEDURE — 87077 CULTURE AEROBIC IDENTIFY: CPT

## 2019-08-26 PROCEDURE — 25500020 PHARM REV CODE 255: Performed by: EMERGENCY MEDICINE

## 2019-08-26 PROCEDURE — 63600175 PHARM REV CODE 636 W HCPCS: Performed by: EMERGENCY MEDICINE

## 2019-08-26 PROCEDURE — 85025 COMPLETE CBC W/AUTO DIFF WBC: CPT

## 2019-08-26 PROCEDURE — 63600175 PHARM REV CODE 636 W HCPCS: Performed by: INTERNAL MEDICINE

## 2019-08-26 PROCEDURE — 87086 URINE CULTURE/COLONY COUNT: CPT

## 2019-08-26 PROCEDURE — 96365 THER/PROPH/DIAG IV INF INIT: CPT

## 2019-08-26 PROCEDURE — 87186 SC STD MICRODIL/AGAR DIL: CPT

## 2019-08-26 PROCEDURE — 87040 BLOOD CULTURE FOR BACTERIA: CPT

## 2019-08-26 PROCEDURE — 83690 ASSAY OF LIPASE: CPT

## 2019-08-26 PROCEDURE — 80053 COMPREHEN METABOLIC PANEL: CPT

## 2019-08-26 PROCEDURE — 83605 ASSAY OF LACTIC ACID: CPT

## 2019-08-26 PROCEDURE — 36415 COLL VENOUS BLD VENIPUNCTURE: CPT

## 2019-08-26 PROCEDURE — 96366 THER/PROPH/DIAG IV INF ADDON: CPT

## 2019-08-26 RX ORDER — HYDROMORPHONE HYDROCHLORIDE 1 MG/ML
0.5 INJECTION, SOLUTION INTRAMUSCULAR; INTRAVENOUS; SUBCUTANEOUS EVERY 30 MIN PRN
Status: DISCONTINUED | OUTPATIENT
Start: 2019-08-26 | End: 2019-08-26

## 2019-08-26 RX ORDER — ONDANSETRON 2 MG/ML
INJECTION INTRAMUSCULAR; INTRAVENOUS
Status: DISPENSED
Start: 2019-08-26 | End: 2019-08-27

## 2019-08-26 RX ORDER — SODIUM CHLORIDE 0.9 % (FLUSH) 0.9 %
5 SYRINGE (ML) INJECTION
Status: DISCONTINUED | OUTPATIENT
Start: 2019-08-26 | End: 2019-08-29 | Stop reason: HOSPADM

## 2019-08-26 RX ORDER — ONDANSETRON 2 MG/ML
8 INJECTION INTRAMUSCULAR; INTRAVENOUS ONCE AS NEEDED
Status: COMPLETED | OUTPATIENT
Start: 2019-08-26 | End: 2019-08-26

## 2019-08-26 RX ORDER — HYDROMORPHONE HYDROCHLORIDE 1 MG/ML
0.5 INJECTION, SOLUTION INTRAMUSCULAR; INTRAVENOUS; SUBCUTANEOUS EVERY 30 MIN PRN
Status: DISCONTINUED | OUTPATIENT
Start: 2019-08-26 | End: 2019-08-27

## 2019-08-26 RX ORDER — ONDANSETRON 2 MG/ML
4 INJECTION INTRAMUSCULAR; INTRAVENOUS EVERY 6 HOURS PRN
Status: DISCONTINUED | OUTPATIENT
Start: 2019-08-26 | End: 2019-08-29 | Stop reason: HOSPADM

## 2019-08-26 RX ORDER — METOCLOPRAMIDE HYDROCHLORIDE 5 MG/ML
10 INJECTION INTRAMUSCULAR; INTRAVENOUS ONCE AS NEEDED
Status: DISCONTINUED | OUTPATIENT
Start: 2019-08-26 | End: 2019-08-26

## 2019-08-26 RX ADMIN — PIPERACILLIN AND TAZOBACTAM 4.5 G: 4; .5 INJECTION, POWDER, LYOPHILIZED, FOR SOLUTION INTRAVENOUS; PARENTERAL at 07:08

## 2019-08-26 RX ADMIN — HYDROMORPHONE HYDROCHLORIDE 0.5 MG: 1 INJECTION, SOLUTION INTRAMUSCULAR; INTRAVENOUS; SUBCUTANEOUS at 12:08

## 2019-08-26 RX ADMIN — ONDANSETRON 8 MG: 2 INJECTION INTRAMUSCULAR; INTRAVENOUS at 07:08

## 2019-08-26 RX ADMIN — IOHEXOL 75 ML: 350 INJECTION, SOLUTION INTRAVENOUS at 06:08

## 2019-08-26 RX ADMIN — HYDROMORPHONE HYDROCHLORIDE 0.5 MG: 1 INJECTION, SOLUTION INTRAMUSCULAR; INTRAVENOUS; SUBCUTANEOUS at 06:08

## 2019-08-26 RX ADMIN — ONDANSETRON 4 MG: 2 INJECTION INTRAMUSCULAR; INTRAVENOUS at 03:08

## 2019-08-26 RX ADMIN — HYDROMORPHONE HYDROCHLORIDE 0.5 MG: 1 INJECTION, SOLUTION INTRAMUSCULAR; INTRAVENOUS; SUBCUTANEOUS at 07:08

## 2019-08-26 RX ADMIN — HYDROMORPHONE HYDROCHLORIDE 0.5 MG: 1 INJECTION, SOLUTION INTRAMUSCULAR; INTRAVENOUS; SUBCUTANEOUS at 05:08

## 2019-08-26 NOTE — HPI
Ms. Brown is a 89 year old woman with PMH HTN, recurrent UTIs who presented to ED 08/26 with a one day history of intermittent worsening abdominal pain, primarily from the RLQ. History is obtained primarily from the patient's daughter, who is her primary caregiver. She denied any fevers or chills at home, but did notice the patient had been more somnolent over the two days prior to admission. The patient minimized symptoms herself, but her daughter noted her moaning and crying in pain and subsequently brought her for evaluation in the ED. CT Abdomen was performed and revealed large volume of free intraperitoneal air with cecal dilation to 11.3cm and abnormal narrowing distally extending 4.6cm suspicious for malignancy, as well as a 7.6cm R hepatic mass. Hospital medicine was contacted for admission.

## 2019-08-26 NOTE — H&P
[facility] Hospital Medicine  History & Physical    Patient Name: Cammy Brown  MRN: [id number]  Admission Date: 8/26/2019  Attending Physician: CATRINA Jean MD  Primary Care Provider: Domo Owens MD    Patient information was obtained from patient, relative(s), past medical records and ER records.     Subjective:     Principal Problem:Cecum perforation    Chief Complaint:   Chief Complaint   Patient presents with    Flank Pain     R flank pain radiating to abdomen with nausea.         HPI: Ms. Brown is a 89/F with PMH HTN, recurrent UTIs who presented to ED 08/26 with a one day history of intermittent worsening abdominal pain, primarily from the RLQ. History is obtained primarily from the patient's daughter, who is her primary caregiver. She denied any fevers or chills at home, but did notice the patient had been more somnolent over the two days prior to admission. The patient minimized symptoms herself, but her daughter noted her moaning and crying in pain and subsequently brought her for evaluation in the ED. CT Abdomen was performed and revealed large volume of free intraperitoneal air with cecal dilation to 11.3cm and abnormal narrowing distally extending 4.6cm suspicious for malignancy, as well as a 7.6cm R hepatic mass. Hospital medicine was contacted for admission.    Past Medical History:   Diagnosis Date    Hyperlipemia     UTI (urinary tract infection)      Past Surgical History:   Procedure Laterality Date    CHOLECYSTECTOMY      HEMORRHOID SURGERY      HERNIA REPAIR      HYSTERECTOMY      KNEE SURGERY       Review of patient's allergies indicates:   Allergen Reactions    Aspirin Shortness Of Breath     No current facility-administered medications on file prior to encounter.      Current Outpatient Medications on File Prior to Encounter   Medication Sig    darifenacin (ENABLEX) 15 mg 24 hr tablet Take 15 mg by mouth once daily.    dorzolamide-timolol 2-0.5%  (COSOPT) 22.3-6.8 mg/mL ophthalmic solution Place 1 drop into both eyes 2 (two) times daily.    EYE DROPS DISPENSER [facility] 1 drop into both eyes 2 (two) times daily.     simvastatin (ZOCOR) 40 MG tablet Take 40 mg by mouth every evening.    trimethoprim (TRIMPEX) 100 mg Tab Take 1 tablet (100 mg total) by mouth once daily. Daily for 2 months, then off for 2 weeks, (scheduled to restart 4/12/17)     Hold while on nitrofurantoin.     Family History     None        Tobacco Use    Smoking status: Never Smoker   Substance and Sexual Activity    Alcohol use: No    Drug use: No    Sexual activity: Not on file     Review of Systems   Constitutional: Negative for chills and fever.   HENT: Negative for sore throat and trouble swallowing.    Eyes: Negative for pain and visual disturbance.   Respiratory: Negative for cough and shortness of breath.    Cardiovascular: Negative for chest pain and palpitations.   Gastrointestinal: Positive for abdominal pain. Negative for nausea and vomiting.   Genitourinary: Negative for difficulty urinating and dysuria.   Musculoskeletal: Negative for arthralgias and myalgias.   Skin: Negative for rash and wound.   Neurological: Negative for weakness and numbness.     Objective:     Vital Signs (Most Recent):  Temp: 98.2 °F (36.8 °C) (08/26/19 0924)  Pulse: 68 (08/26/19 1035)  Resp: 14 (08/26/19 0624)  BP: 133/63 (08/26/19 1031)  SpO2: 98 % (08/26/19 1035) Vital Signs (24h Range):  Temp:  [98.2 °F (36.8 °C)-98.5 °F (36.9 °C)] 98.2 °F (36.8 °C)  Pulse:  [60-70] 68  Resp:  [14-18] 14  SpO2:  [94 %-98 %] 98 %  BP: (127-172)/(63-78) 133/63     Weight: 77.1 kg (170 lb)  Body mass index is 28.29 kg/m².    Physical Exam   Constitutional: She appears well-developed. No distress.   HENT:   Head: Normocephalic and atraumatic.   Eyes: Conjunctivae are normal. Right eye exhibits no discharge. Left eye exhibits no discharge.   Cardiovascular: Normal rate and intact distal pulses.   Pulmonary/Chest:  Effort normal. No respiratory distress.   Abdominal: There is tenderness (diffusely, RLQ > other quadrants). There is guarding.   Musculoskeletal: Normal range of motion. She exhibits no edema.   Neurological:   Drowsy but arousable   Skin: Skin is warm and dry.   Nursing note and vitals reviewed.         Significant Labs:   CBC:  Recent Labs   Lab 08/26/19  0534   WBC 11.55   HGB 9.2*   HCT 31.0*   *   GRAN 79.7*  9.2*   LYMPH 9.2*  1.1   MONO 6.7  0.8   EOS 0.3   BASO 0.08      CMP:  Recent Labs   Lab 08/26/19  0534   *   K 3.8      CO2 24   BUN 13   CREATININE 0.7   *   CALCIUM 8.7   ALKPHOS 97   AST 16   ALT 11   BILITOT 0.5   PROT 6.2   ALBUMIN 2.9*   ANIONGAP 9      Significant Imaging:   CXR 08/26/19:  Abnormal lucency beneath the bilateral hemidiaphragms concerning for free intraperitoneal air.  Further evaluation with abdominal CT is advised. No radiographic evidence of acute intrathoracic process.    CT Abd/Pelvis W Contrast 08/26/19:  Large volume of free intraperitoneal air which is favored to arise from the markedly distended cecum.  There is apparent abnormal luminal narrowing just distal to the dilated cecum concerning for underlying ascending colon mass/malignancy.  Surgical consultation is advised. Interval development of a large ill-defined 7.6 cm right hepatic lobe mass concerning for malignancy, possibly metastatic in etiology. Postoperative change of prior ventral hernia repair.  There are multiple recurrence hernias along the superior and inferior mesh margin, some of which contain large bowel. Status post cholecystectomy.  Mild intra and extrahepatic biliary ductal dilatation, mildly increased in extent compared to prior examination. Stable appearing bilateral adrenal nodules. Interval development of mild T12 superior endplate compression deformity, new compared to prior study of January 2014.     Assessment/Plan:     * Cecum perforation  - Cecal perforation with  pneumoperitoneum, hepatic and colonic masses as noted on CT.  - General surgery, palliative care consulted in ED. Surgical intervention would most likely be extensive, potentially with diverting ileostomy; given underlying masses highly suspicious for metastatic malignancy (colorectal versus hepatic), discuss goals of care with the patient's daughter and son (in transit), as the patient's daughter states her goals of care would not be consistent with pursuing treatment for cancer even if surgical intervention would be desired.  - Initiate comfort care measures: hydromorphone 0.5mg IV q30min PRN pain, ondansetron 4mg IV q6hr PRN and promethazine 6.25mg IV q6hr PRN as first/second choices for nausea respectively.  - Monitor for worsening pain, agitation, anxiety.  - Appreciate palliative care assistance. Likely pursue palliative placement if so desired.    Microcytic anemia  - Noted.    Pneumoperitoneum  - As under perforation.    Goals of care, counseling/discussion  - As under perforation.    Liver mass  - As under perforation.    Colonic mass  - As under perforation.    VTE Risk Mitigation (From admission, onward)    None        CATRINA Jean MD  Department of Hospital Medicine   [facility]

## 2019-08-26 NOTE — PLAN OF CARE
Problem: Spiritual Distress Risk or Actual  Goal: Spiritual Wellbeing    Intervention: Promote Spiritual Wellbeing  F - Pastora and Belief: Sikhism. Daughter requested 's blessing.  Request left for  Ivan to offer anointing/blessing.     I - Importance: Yes.    C - Community: Pt's daughter and daughter's friends waiting in another room. Daughter's  passed away less than a year ago from cancer. Grief over loss of  is layering with pt's grief over recent cancer diagnosis for patient.     A - Address in Care: Introduced and offered pastoral support with reflective listening, compassionate presence, and discussion re: end of of life with family, case management, and palliative care. Family given pt's contact info for follow-up as needed. Spiritual Care will continue to follow.

## 2019-08-26 NOTE — CONSULTS
Palliative Care Consult Note     S: Cammy Brown is a 89 y.o. female admitted with Cecum perforation with suspected mass in colon and  metastasis in liver. Palliative Care referral received from Dr. Becerra and has been contacted regarding referral and requests the team focus on GOC and Hospice information . Sampson, Hospitalist and PC at  for conference.    B: Past Medical History:  has a past medical history of Hyperlipemia and UTI (urinary tract infection).   Code Status: Prior   Advanced Directives:  not on file   Family/Support: Daughter and main CG Tara Brown ([phone]) and son Lenny Brown ([phone])    A: Patient's current condition poor.    Discharge Planning: Pt to be admitted to [facility] for monitoring of symptoms. Plan is to discharge to [facility] inpatient facility tomorrow morning.  [facility] by Tara,  and best friend received services from [facility] at their facility.  passed away 9/11/18 and best friend passed away at same facility within days of . Tara has stated she is having difficulty processing  Loss of loved ones in a short time. Dr. Ted Braden, friends, family and PC present- emotional support given. Tara and family allowed to express feelings of sadness and love for Ms. Chawla.      R: Support offered at this time.   Palliative Care Team will continue to follow patient.     Vickie Jenkins

## 2019-08-26 NOTE — PLAN OF CARE
08/26/19 1400   Discharge Assessment   Confirmed/corrected address and phone number on facesheet? Yes   Assessment information obtained from? Caregiver  (daughter - says mom confused @ times)   Communicated expected length of stay with patient/caregiver yes   Prior to hospitilization cognitive status: Alert/Oriented   Prior to hospitalization functional status: Assistive Equipment   Current cognitive status: Alert/Oriented   Current Functional Status: Assistive Equipment   Lives With child(niko), adult   Able to Return to Prior Arrangements yes   Is patient able to care for self after discharge? Yes   Patient's perception of discharge disposition admitted as an inpatient   Readmission Within the Last 30 Days no previous admission in last 30 days   Patient currently being followed by outpatient case management? No   Patient currently receives any other outside agency services? No   Equipment Currently Used at Home rollator;cane, straight;commode   Do you have any problems affording any of your prescribed medications? TBD   Is the patient taking medications as prescribed? yes   Does the patient have transportation home? Yes  (daughter has raised house cannot bring upstairs she said )   Transportation Anticipated other (see comments)  (ambulance possible hospice inpt )   Does the patient receive services at the [facility]? No   Discharge Plan A Inpatient Hospice   Discharge Plan B Inpatient Hospice  (daughter is uncertain about home hospice or nhome hospice )   DME Needed Upon Discharge  none   Patient/Family in Agreement with Plan yes      RNCM met with patient DAUGHTER very tearful and wanted to tell the story about her and her mom.    The RN AM and  with pastoral care was in conf room with daughter . Dr Becerra requested palliative care Rn also  ...Paulette SKINNER  Called Rosey Palliative care Rn.  Rosey said  she will come and speak with pt's daughter and that  she received the consult . The Pt's Daughter states her mother  has dementia is  confused @ times .The Daughter states she has the  POA does not have copy with her and  her brother lives in [place] will be in [place]  after 3pm today . She called he brother while I was in  The room about her mother.     She/daughter was  Unsure of what she wants to do as far a treatment plan for her  Mother's condition or have if she should have  hospice @ this time. RN BRODY did teaching with daughter and support given about her options . She /daughter stated she does have long term plan of [facility] for her mother and  does not like nursing home hospices and she feels she could not handle her mother dying in her home with home hospice - daughter stated her   1 year ago with cancer @ [facility].     Prior to admission patient was independent with asstive DMe  ADLs.       19 1400   Discharge Assessment   Confirmed/corrected address and phone number on facesheet? Yes   Assessment information obtained from? Caregiver  (daughter - says mom confused @ times)   Communicated expected length of stay with patient/caregiver yes   Prior to hospitilization cognitive status: Alert/Oriented   Prior to hospitalization functional status: Assistive Equipment   Current cognitive status: Alert/Oriented   Current Functional Status: Assistive Equipment   Lives With child(niko), adult   Able to Return to Prior Arrangements yes   Is patient able to care for self after discharge? Yes   Patient's perception of discharge disposition admitted as an inpatient   Readmission Within the Last 30 Days no previous admission in last 30 days   Patient currently being followed by outpatient case management? No   Patient currently receives any other outside agency services? No   Equipment Currently Used at Home rollator;cane, straight;commode   Do you have any problems affording any of your prescribed medications? TBD   Is the patient taking medications as prescribed? yes   Does the patient have  transportation home? Yes  (daughter has raised house cannot bring upstairs she said )   Transportation Anticipated other (see comments)  (ambulance possible hospice inpt )   Does the patient receive services at the Coumadin Clinic? No   Discharge Plan A Inpatient Hospice   Discharge Plan B Inpatient Hospice  (daughter is uncertain about home hospice or nhome hospice )   DME Needed Upon Discharge  none   Patient/Family in Agreement with Plan yes   DME@ home rollator , cane bsc  .       Patient denies a history of mental illness.         Patients will need  transport home at discharge.         CM team will continue to follow.

## 2019-08-26 NOTE — ED NOTES
"Dr. Becerra at bedside assessing pt.  A&Ox4 with respirations even and unlabored.  C/o abd pain to RLQ of a "7" on 1-10 scale.  Denies nausea at present time.  Awaiting blood cultures to be drawn prior to starting ABT.  Daughter at bedside.  "

## 2019-08-26 NOTE — ED NOTES
Rounding completed on pt. Pt remains on 2 L NC, will desat to 90% when she removes oxygen and when sleeping. Pt denies any c/o at this time. Pt remains afebrile with VSS. Abd remains distended, no tenderness to palpation.

## 2019-08-26 NOTE — ED NOTES
Assumed care of pt from MICHELLE Bland. Pt currently in CT. IV infiltrated and Ally MARTINEZ in CT room for new IV access.

## 2019-08-26 NOTE — PLAN OF CARE
Problem: Spiritual Distress Risk or Actual  Goal: Spiritual Wellbeing    Intervention: Promote Spiritual Wellbeing  Provided f/u visit. Pt present. Family away. No spiritual needs expressed at this time. Pt made aware  Ivan may not visit to offering blessing until Tuesday.

## 2019-08-26 NOTE — ED NOTES
No change in pt status. VSS at this time. Pt sleeping in stretcher with HOB at 45 degrees. Even, unlabored respirations noted.

## 2019-08-26 NOTE — SUBJECTIVE & OBJECTIVE
Past Medical History:   Diagnosis Date    Hyperlipemia     UTI (urinary tract infection)      Past Surgical History:   Procedure Laterality Date    CHOLECYSTECTOMY      HEMORRHOID SURGERY      HERNIA REPAIR      HYSTERECTOMY      KNEE SURGERY       Review of patient's allergies indicates:   Allergen Reactions    Aspirin Shortness Of Breath     No current facility-administered medications on file prior to encounter.      Current Outpatient Medications on File Prior to Encounter   Medication Sig    darifenacin (ENABLEX) 15 mg 24 hr tablet Take 15 mg by mouth once daily.    dorzolamide-timolol 2-0.5% (COSOPT) 22.3-6.8 mg/mL ophthalmic solution Place 1 drop into both eyes 2 (two) times daily.    EYE DROPS DISPENSER MISC Place 1 drop into both eyes 2 (two) times daily.     simvastatin (ZOCOR) 40 MG tablet Take 40 mg by mouth every evening.    trimethoprim (TRIMPEX) 100 mg Tab Take 1 tablet (100 mg total) by mouth once daily. Daily for 2 months, then off for 2 weeks, (scheduled to restart 4/12/17)     Hold while on nitrofurantoin.     Family History     None        Tobacco Use    Smoking status: Never Smoker   Substance and Sexual Activity    Alcohol use: No    Drug use: No    Sexual activity: Not on file     Review of Systems   Constitutional: Negative for chills and fever.   HENT: Negative for sore throat and trouble swallowing.    Eyes: Negative for pain and visual disturbance.   Respiratory: Negative for cough and shortness of breath.    Cardiovascular: Negative for chest pain and palpitations.   Gastrointestinal: Positive for abdominal pain. Negative for nausea and vomiting.   Genitourinary: Negative for difficulty urinating and dysuria.   Musculoskeletal: Negative for arthralgias and myalgias.   Skin: Negative for rash and wound.   Neurological: Negative for weakness and numbness.     Objective:     Vital Signs (Most Recent):  Temp: 98.2 °F (36.8 °C) (08/26/19 0924)  Pulse: 68 (08/26/19 1035)  Resp:  14 (08/26/19 0624)  BP: 133/63 (08/26/19 1031)  SpO2: 98 % (08/26/19 1035) Vital Signs (24h Range):  Temp:  [98.2 °F (36.8 °C)-98.5 °F (36.9 °C)] 98.2 °F (36.8 °C)  Pulse:  [60-70] 68  Resp:  [14-18] 14  SpO2:  [94 %-98 %] 98 %  BP: (127-172)/(63-78) 133/63     Weight: 77.1 kg (170 lb)  Body mass index is 28.29 kg/m².    Physical Exam   Constitutional: She appears well-developed. No distress.   HENT:   Head: Normocephalic and atraumatic.   Eyes: Conjunctivae are normal. Right eye exhibits no discharge. Left eye exhibits no discharge.   Cardiovascular: Normal rate and intact distal pulses.   Pulmonary/Chest: Effort normal. No respiratory distress.   Abdominal: There is tenderness (diffusely, RLQ > other quadrants). There is guarding.   Musculoskeletal: Normal range of motion. She exhibits no edema.   Neurological:   Drowsy but arousable   Skin: Skin is warm and dry.   Nursing note and vitals reviewed.         Significant Labs:   CBC:  Recent Labs   Lab 08/26/19  0534   WBC 11.55   HGB 9.2*   HCT 31.0*   *   GRAN 79.7*  9.2*   LYMPH 9.2*  1.1   MONO 6.7  0.8   EOS 0.3   BASO 0.08      CMP:  Recent Labs   Lab 08/26/19  0534   *   K 3.8      CO2 24   BUN 13   CREATININE 0.7   *   CALCIUM 8.7   ALKPHOS 97   AST 16   ALT 11   BILITOT 0.5   PROT 6.2   ALBUMIN 2.9*   ANIONGAP 9      Significant Imaging:   CXR 08/26/19:  Abnormal lucency beneath the bilateral hemidiaphragms concerning for free intraperitoneal air.  Further evaluation with abdominal CT is advised. No radiographic evidence of acute intrathoracic process.    CT Abd/Pelvis W Contrast 08/26/19:  Large volume of free intraperitoneal air which is favored to arise from the markedly distended cecum.  There is apparent abnormal luminal narrowing just distal to the dilated cecum concerning for underlying ascending colon mass/malignancy.  Surgical consultation is advised. Interval development of a large ill-defined 7.6 cm right hepatic lobe  mass concerning for malignancy, possibly metastatic in etiology. Postoperative change of prior ventral hernia repair.  There are multiple recurrence hernias along the superior and inferior mesh margin, some of which contain large bowel. Status post cholecystectomy.  Mild intra and extrahepatic biliary ductal dilatation, mildly increased in extent compared to prior examination. Stable appearing bilateral adrenal nodules. Interval development of mild T12 superior endplate compression deformity, new compared to prior study of January 2014.

## 2019-08-26 NOTE — ED PROVIDER NOTES
Encounter Date: 8/26/2019       History     Chief Complaint   Patient presents with    Flank Pain     R flank pain radiating to abdomen with nausea.      HPI   History provided by the patient, medical record, patient's daughter.  Patient presents with right lower quadrant abdominal pain over last day.  No associated fevers.  No associated dysuria, however patient's daughter notes that the patient essentially never has urinary symptoms of UTI, but has recurrence chronic UTIs.  Often she will have confusion associated with a acute UTI.  Last UTI was about 1 month ago.  Patient's daughter notes that the patient slept much more than usual 2 days ago.  Patient's daughter made a point of having the patient drink 5-6x 16oz water over the course of the day to prevent dehydration.  Patient's daughter denies any recent fevers, noting that they take the patient's temperature every morning and evening.  The patient complained of some right flank and perhaps abdominal pain yesterday, but minimized her symptoms.  The patient's daughter brought her to the emergency department today because she heard the patient whimpering, moaning, crying, asking God for help but was reluctant to be evaluated.  The patient's daughter believes the patient still has her appendix, but the patient believes she had an appendectomy.       Review of patient's allergies indicates:   Allergen Reactions    Aspirin Shortness Of Breath     Past Medical History:   Diagnosis Date    Hyperlipemia     UTI (urinary tract infection)      Past Surgical History:   Procedure Laterality Date    CHOLECYSTECTOMY      HEMORRHOID SURGERY      HERNIA REPAIR      HYSTERECTOMY      KNEE SURGERY       History reviewed. No pertinent family history.  Social History     Tobacco Use    Smoking status: Never Smoker   Substance Use Topics    Alcohol use: No    Drug use: No     Review of Systems  ROS: As per HPI and below:   General: No fever.   HENT: No facial pain.  "  Eyes: Negative for eye pain.   Cardiovascular: No chest pain.   Respiratory:  No dyspnea.   GI: No abdominal pain. No nausea. No vomiting. No diarrhea.   Skin: No rashes.   Neuro:  No syncope.  No focal deficits.   Musculoskeletal: No extremity pain.  All other systems reviewed and are negative.    Physical Exam     Initial Vitals [08/26/19 0517]   BP Pulse Resp Temp SpO2   (!) 147/70 60 18 98.3 °F (36.8 °C) (!) 94 %      MAP       --         Physical Exam  Nursing note and vitals reviewed.  BP (!) 140/65   Pulse 60   Temp 98.3 °F (36.8 °C) (Oral)   Resp 16   Ht 5' 5" (1.651 m)   Wt 77.1 kg (170 lb)   LMP  (LMP Unknown)   SpO2 96%   Breastfeeding? No   BMI 28.29 kg/m²   Constitutional: AAOx3. No distress.  Eyes: EOMI. No discharge. Anicteric.  HENT:   Mouth/Throat: Oropharynx is clear. Uvula midline. Mucus membranes moist.  Neck: Normal range of motion. Neck supple.  Cardiovascular: Normal rate. No murmur, no gallop and no friction rub heard.   Pulmonary/Chest: No respiratory distress. Effort normal. No wheezes, no rales, no rhonchi.   Abdominal: Bowel sounds normal. Soft. Distension. No mass. There is right lower quadrant tenderness. Mild LLQ tenderness. There is no rebound, no guarding.   Musculoskeletal: Normal range of motion.   Neurological: GCS 15. Alert and oriented to person, place, and time. No gross cranial nerve, light touch or strength deficit. Coordination normal.   Skin: Skin is warm and dry.   Psychiatric: Behavior is normal. Judgment normal.    ED Course   Procedures     =====================================  Critical Care:  45 minutes total critical care time was personally spent by me, exclusive of procedures and separately billable time.   Critical care was necessary to treat or prevent imminent or life-threatening deterioration of the following conditions: ruptured viscus, abdominal pain, sepsis.    =====================================      Labs Reviewed   CBC W/ AUTO DIFFERENTIAL - " Abnormal; Notable for the following components:       Result Value    Hemoglobin 9.2 (*)     Hematocrit 31.0 (*)     Mean Corpuscular Volume 73 (*)     Mean Corpuscular Hemoglobin 21.5 (*)     Mean Corpuscular Hemoglobin Conc 29.7 (*)     RDW 16.8 (*)     Platelets 422 (*)     Immature Granulocytes 0.8 (*)     Gran # (ANC) 9.2 (*)     Immature Grans (Abs) 0.09 (*)     Gran% 79.7 (*)     Lymph% 9.2 (*)     All other components within normal limits   COMPREHENSIVE METABOLIC PANEL   LIPASE   URINALYSIS, REFLEX TO URINE CULTURE          Imaging Results          X-Ray Chest AP Portable (In process)                               Attending Attestation:             Attending ED Notes:   Patient is a 89-year-old female with history of chronic recurrent UTIs, history of cholecystectomy and large ventral hernia who presents with right lower quadrant abdominal pain over the past day.  On my initial physical exam, the patient did not have surgical abdomen, but had right lower quadrant tenderness, no flank tenderness, had a little mild left lower quadrant tenderness. Physical exam concerning for acute appendicitis, ruptured viscus, incarcerated hernia, bowel obstruction.           ED Course as of Aug 26 0612   Mon Aug 26, 2019   0612 I discussed the patient history, findings, plan with Dr. Becerra, who assumes care.        [RC]      ED Course User Index  [RC] Juan Jose Sheppard MD     Clinical Impression:       ICD-10-CM ICD-9-CM   1. Right lower quadrant abdominal tenderness without rebound tenderness R10.813 789.63                                Juan Jose Sheppard MD  08/26/19 0612       Juan Jose Sheppard MD  09/06/19 0517

## 2019-08-26 NOTE — CONSULTS
89-year-old female who presents the emergency room with a 1 day history of right-sided abdominal and flank pain. The patient lives with her daughter who is her primary care provider.  The daughter noticed the patient having pain and moaning during the night and the daughter took the patient to the emergency room.     The patient is awake and alert with stable vital signs. She complains of abdominal pain. On physical exam her abdomen is distended and diffusely tender.  There are positive bowel sounds.  The patient has a midline incision with a large ventral hernia.      Imaging studies including chest x-ray and CT scan of the abdomen were performed this morning.  These showed a near obstructing lesion in the ascending colon with a large metastasis to the right lobe of the liver. The patient has free air under the diaphragm.     I had an hour long discussion with the patient's daughter and son this morning.  I explained to them that the patient has advanced cancer and that this could not be cured.  The most immediate problem however is that the patient has a perforated colon.  I explained to them that this is a dire situation and that the patient would more than likely be unable to return to her normal functional status.  I explained that the patient needed a partial colectomy and likely ileostomy.  That given a perforation of the colon 2nd look surgery with washout would likely be recommended.  The family understood that the patient Singer significant challenges to recovery.  They also understand that due to the large metastatic burden that the patient likely has 6 months or less to live.  That during that time the quality of life with likely deteriorate.  The family wishes for palliative measures only.  I believe that this is a reasonable course in this 89-year-old.

## 2019-08-26 NOTE — ASSESSMENT & PLAN NOTE
- Cecal perforation with pneumoperitoneum, hepatic and colonic masses as noted on CT.  - General surgery, palliative care consulted in ED. Surgical intervention would most likely be extensive, potentially with diverting ileostomy; given underlying masses highly suspicious for metastatic malignancy (colorectal versus hepatic), discuss goals of care with the patient's daughter and son (in transit), as the patient's daughter states her goals of care would not be consistent with pursuing treatment for cancer even if surgical intervention would be desired.  - Initiate comfort care measures: hydromorphone 0.5mg IV q30min PRN pain, ondansetron 4mg IV q6hr PRN and promethazine 6.25mg IV q6hr PRN as first/second choices for nausea respectively.  - Monitor for worsening pain, agitation, anxiety.  - Appreciate palliative care assistance. Likely pursue palliative placement if so desired.

## 2019-08-26 NOTE — PLAN OF CARE
Problem: Adult Inpatient Plan of Care  Goal: Plan of Care Review  Outcome: Ongoing (interventions implemented as appropriate)  Patient stable. VSS. Comfort care measures in place. 3L/NC. Denies pain. Nausea treated with PRN Zofran. Denies needs. Call bell in place. Side rails up X2. Bed locked, lowered. Safety maintained.

## 2019-08-27 PROCEDURE — 27000221 HC OXYGEN, UP TO 24 HOURS

## 2019-08-27 PROCEDURE — 99900035 HC TECH TIME PER 15 MIN (STAT)

## 2019-08-27 PROCEDURE — 11000001 HC ACUTE MED/SURG PRIVATE ROOM

## 2019-08-27 PROCEDURE — 99233 SBSQ HOSP IP/OBS HIGH 50: CPT | Mod: ,,, | Performed by: HOSPITALIST

## 2019-08-27 PROCEDURE — 63600175 PHARM REV CODE 636 W HCPCS: Performed by: HOSPITALIST

## 2019-08-27 PROCEDURE — 94761 N-INVAS EAR/PLS OXIMETRY MLT: CPT

## 2019-08-27 PROCEDURE — 99233 PR SUBSEQUENT HOSPITAL CARE,LEVL III: ICD-10-PCS | Mod: ,,, | Performed by: HOSPITALIST

## 2019-08-27 PROCEDURE — 63600175 PHARM REV CODE 636 W HCPCS: Performed by: INTERNAL MEDICINE

## 2019-08-27 PROCEDURE — 25000003 PHARM REV CODE 250: Performed by: HOSPITALIST

## 2019-08-27 RX ORDER — LORAZEPAM 2 MG/ML
1 CONCENTRATE ORAL EVERY 8 HOURS PRN
Status: DISCONTINUED | OUTPATIENT
Start: 2019-08-27 | End: 2019-08-29 | Stop reason: HOSPADM

## 2019-08-27 RX ORDER — MORPHINE SULFATE ORAL SOLUTION 10 MG/5ML
10 SOLUTION ORAL EVERY 4 HOURS
Status: DISCONTINUED | OUTPATIENT
Start: 2019-08-27 | End: 2019-08-29 | Stop reason: HOSPADM

## 2019-08-27 RX ORDER — HYDROMORPHONE HYDROCHLORIDE 1 MG/ML
1 INJECTION, SOLUTION INTRAMUSCULAR; INTRAVENOUS; SUBCUTANEOUS
Status: DISCONTINUED | OUTPATIENT
Start: 2019-08-27 | End: 2019-08-29 | Stop reason: HOSPADM

## 2019-08-27 RX ADMIN — HYDROMORPHONE HYDROCHLORIDE 1 MG: 1 INJECTION, SOLUTION INTRAMUSCULAR; INTRAVENOUS; SUBCUTANEOUS at 04:08

## 2019-08-27 RX ADMIN — HYDROMORPHONE HYDROCHLORIDE 0.5 MG: 1 INJECTION, SOLUTION INTRAMUSCULAR; INTRAVENOUS; SUBCUTANEOUS at 11:08

## 2019-08-27 RX ADMIN — MORPHINE SULFATE 10 MG: 10 SOLUTION ORAL at 06:08

## 2019-08-27 RX ADMIN — ONDANSETRON 4 MG: 2 INJECTION INTRAMUSCULAR; INTRAVENOUS at 10:08

## 2019-08-27 RX ADMIN — HYDROMORPHONE HYDROCHLORIDE 0.5 MG: 1 INJECTION, SOLUTION INTRAMUSCULAR; INTRAVENOUS; SUBCUTANEOUS at 09:08

## 2019-08-27 RX ADMIN — PROMETHAZINE HYDROCHLORIDE 6.25 MG: 25 INJECTION INTRAMUSCULAR; INTRAVENOUS at 11:08

## 2019-08-27 RX ADMIN — HYDROMORPHONE HYDROCHLORIDE 0.5 MG: 1 INJECTION, SOLUTION INTRAMUSCULAR; INTRAVENOUS; SUBCUTANEOUS at 08:08

## 2019-08-27 RX ADMIN — MORPHINE SULFATE 10 MG: 10 SOLUTION ORAL at 03:08

## 2019-08-27 RX ADMIN — MORPHINE SULFATE 10 MG: 10 SOLUTION ORAL at 10:08

## 2019-08-27 RX ADMIN — HYDROMORPHONE HYDROCHLORIDE 1 MG: 1 INJECTION, SOLUTION INTRAMUSCULAR; INTRAVENOUS; SUBCUTANEOUS at 11:08

## 2019-08-27 RX ADMIN — HYDROMORPHONE HYDROCHLORIDE 0.5 MG: 1 INJECTION, SOLUTION INTRAMUSCULAR; INTRAVENOUS; SUBCUTANEOUS at 04:08

## 2019-08-27 NOTE — HOSPITAL COURSE
Patient was admitted and general surgery was consulted.  They did not feel surgery was indicated as patient was unlikely to survive the procedure and the tumor burden was severe.  Family agreed to DNR status and to make patient comfortable.  Palliative care followed while she was here.  Family was reluctant to allow patient to be transferred to inpatient hospice, so we spent a couple of days addressing her comfort needs and making sure she was comfortable enough to be moved.  Patient's pain and anxiety were well controlled on discharge but she was unable to swallow and she was usually somnolent.  She exhibited delirium when awake.  Patient's daughter and sons were involved in the decision making and she was transferred to [facility] for inpatient hospice care on 8/29.

## 2019-08-27 NOTE — PLAN OF CARE
provided a compassionate presence and prayer support for patient as well as reflective listening, life review and grief care for family.

## 2019-08-27 NOTE — PLAN OF CARE
CM assessment complete with record review and CM spoke with Rosey Jenkins RN Palliative care.    CM to follow for plans and arrangements.     08/27/19 1411   Discharge Assessment   Assessment Type Discharge Planning Assessment   Confirmed/corrected address and phone number on facesheet? Yes   Assessment information obtained from? Medical Record   Lives With child(niko), adult

## 2019-08-27 NOTE — PLAN OF CARE
Problem: Adult Inpatient Plan of Care  Goal: Plan of Care Review  Outcome: Ongoing (interventions implemented as appropriate)  Good saturation on nasal O2.

## 2019-08-27 NOTE — ASSESSMENT & PLAN NOTE
- Cecal perforation with pneumoperitoneum, hepatic and colonic masses as noted on CT.  Likely metastatic colon cancer.  - General surgery, palliative care consulted in ED. Surgical intervention would most likely be extensive, potentially with diverting ileostomy; given underlying masses highly suspicious for metastatic malignancy (colorectal versus hepatic), discuss goals of care with the patient's daughter and son (in transit), as the patient's daughter states her goals of care would not be consistent with pursuing treatment for cancer even if surgical intervention would be desired.  - Continue comfort care measures: oral morphine concentrate every 4 hours ATC with hydromorphone 1 mg IV hourly PRN breakthrough pain, ondansetron 4mg IV q6hr PRN and promethazine 6.25mg IV q6hr PRN as first/second choices for nausea respectively.  Add Ativan for severe anxiety.  - Monitor for worsening pain, agitation, anxiety.  - Appreciate palliative care assistance. Likely pursue hospice placement unless she expires in the next day or so.  Family not currently willing to accept immediate transfer.

## 2019-08-27 NOTE — CONSULTS
Palliative Care Progress Note:    Received call from daughter Tara Segovia. Tara reports caregiver at mother's BS states Pt is crying in pain. Tara feels doses of pain medication are spread too far apart and not being given frequently enough to control breakthrough pain. Requests PC discuss with medical staff. Tara states she and her brother have decided to have Pt remain at hospital to pass in the hospital. Tara and brother will be at hospital within the hour. Would like to have conference with medical team to disucss POC. Will relay to team.

## 2019-08-27 NOTE — PLAN OF CARE
Problem: Adult Inpatient Plan of Care  Goal: Plan of Care Review  Outcome: Ongoing (interventions implemented as appropriate)  Patient on 3 liters nasal cannula.

## 2019-08-27 NOTE — CONSULTS
Palliative Care Progress Note:      Dr. Luo updated on phone conversation with daughter Tara Brown, discussed pain management and request for conference with team to discuss POC.

## 2019-08-27 NOTE — PLAN OF CARE
Plan of care reviewed with patient and family. Patient currently resting in NAD. Purposeful hourly rounding performed. Pain managed with PRN medication. Purewick in place to wall suction. Safety measures maintained. Pt instructed to use call light for assistance. Will continue to monitor.

## 2019-08-27 NOTE — SUBJECTIVE & OBJECTIVE
Interval History:  Patient is new to me.  Appears to be in severe pain but lethargic.  Extra dose Dilaudid with Phenergan administered after which she went to sleep and appeared more comfortable.  Patient's daughter at bedside distraught over patient's condition.  She does not feel comfortable with transfer to [facility] today.  Agreed to wait and see.    Review of Systems   Unable to perform ROS: Acuity of condition   Patient moaning in pain.    Objective:     Vital Signs (Most Recent):  Temp: 98.2 °F (36.8 °C) (08/27/19 0726)  Pulse: 71 (08/27/19 0726)  Resp: 14 (08/27/19 0726)  BP: 136/63 (08/27/19 0726)  SpO2: 99 % (08/27/19 0726) Vital Signs (24h Range):  Temp:  [98 °F (36.7 °C)-98.2 °F (36.8 °C)] 98.2 °F (36.8 °C)  Pulse:  [70-75] 71  Resp:  [14-20] 14  SpO2:  [94 %-99 %] 99 %  BP: (136-189)/(63-83) 136/63     Weight: 77.4 kg (170 lb 10.2 oz)  Body mass index is 28.4 kg/m².    Intake/Output Summary (Last 24 hours) at 8/27/2019 1557  Last data filed at 8/27/2019 0600  Gross per 24 hour   Intake 50 ml   Output 200 ml   Net -150 ml      Physical Exam   Constitutional: She appears well-developed.   Lethargic, elderly woman, moaning in pain.   HENT:   Head: Normocephalic.   Eyes: Pupils are equal, round, and reactive to light. Conjunctivae are normal.   Neck: Neck supple. No thyromegaly present.   Cardiovascular: Normal rate, regular rhythm and intact distal pulses. Exam reveals no gallop and no friction rub.   Murmur heard.  Pulmonary/Chest: Effort normal.   Breath sounds decreased throughout.   Abdominal: Soft.   Bowel sounds hyperactive.  Large ventral hernia noted but abdomen not palpated due to pain.   Musculoskeletal: She exhibits no edema.   Lymphadenopathy:     She has no cervical adenopathy.   Neurological:   Not answering questions.   Skin: Skin is warm and dry. No rash noted.       Significant Labs: All pertinent labs within the past 24 hours have been reviewed.    Significant Imaging: I have reviewed all  pertinent imaging results/findings within the past 24 hours.

## 2019-08-27 NOTE — CONSULTS
Palliative Care Progress Note:    Updated primary nurse Asher, training with Gila. Gila just adminitered pain medication within the last 10 minutes. Will reassess Pt response.

## 2019-08-27 NOTE — PROGRESS NOTES
[facility] Hospital Medicine  Progress Note    Patient Name: Cammy Brown  MRN: [id number]  Patient Class: IP- Inpatient   Admission Date: 8/26/2019  Length of Stay: 1 days  Attending Physician: Sangita Luo MD  Primary Care Provider: Domo Owens MD        Subjective:     Principal Problem:Cecum perforation        HPI:  Ms. Brown is a 89/F with PMH HTN, recurrent UTIs who presented to ED 08/26 with a one day history of intermittent worsening abdominal pain, primarily from the RLQ. History is obtained primarily from the patient's daughter, who is her primary caregiver. She denied any fevers or chills at home, but did notice the patient had been more somnolent over the two days prior to admission. The patient minimized symptoms herself, but her daughter noted her moaning and crying in pain and subsequently brought her for evaluation in the ED. CT Abdomen was performed and revealed large volume of free intraperitoneal air with cecal dilation to 11.3cm and abnormal narrowing distally extending 4.6cm suspicious for malignancy, as well as a 7.6cm R hepatic mass. Hospital medicine was contacted for admission.    Overview/Hospital Course:  Patient was admitted and general surgery was consulted.  They did not feel surgery was indicated as patient was unlikely to survive the procedure and the tumor burden was severe.  Family agreed to DNR status and to make patient comfortable.  Palliative care followed while she was here.      Interval History:  Patient is new to me.  Appears to be in severe pain but lethargic.  Extra dose Dilaudid with Phenergan administered after which she went to sleep and appeared more comfortable.  Patient's daughter at bedside distraught over patient's condition.  She does not feel comfortable with transfer to [facility] today.  Agreed to wait and see.    Review of Systems   Unable to perform ROS: Acuity of condition   Patient moaning in pain.    Objective:      Vital Signs (Most Recent):  Temp: 98.2 °F (36.8 °C) (08/27/19 0726)  Pulse: 71 (08/27/19 0726)  Resp: 14 (08/27/19 0726)  BP: 136/63 (08/27/19 0726)  SpO2: 99 % (08/27/19 0726) Vital Signs (24h Range):  Temp:  [98 °F (36.7 °C)-98.2 °F (36.8 °C)] 98.2 °F (36.8 °C)  Pulse:  [70-75] 71  Resp:  [14-20] 14  SpO2:  [94 %-99 %] 99 %  BP: (136-189)/(63-83) 136/63     Weight: 77.4 kg (170 lb 10.2 oz)  Body mass index is 28.4 kg/m².    Intake/Output Summary (Last 24 hours) at 8/27/2019 1557  Last data filed at 8/27/2019 0600  Gross per 24 hour   Intake 50 ml   Output 200 ml   Net -150 ml      Physical Exam   Constitutional: She appears well-developed.   Lethargic, elderly woman, moaning in pain.   HENT:   Head: Normocephalic.   Eyes: Pupils are equal, round, and reactive to light. Conjunctivae are normal.   Neck: Neck supple. No thyromegaly present.   Cardiovascular: Normal rate, regular rhythm and intact distal pulses. Exam reveals no gallop and no friction rub.   Murmur heard.  Pulmonary/Chest: Effort normal.   Breath sounds decreased throughout.   Abdominal: Soft.   Bowel sounds hyperactive.  Large ventral hernia noted but abdomen not palpated due to pain.   Musculoskeletal: She exhibits no edema.   Lymphadenopathy:     She has no cervical adenopathy.   Neurological:   Not answering questions.   Skin: Skin is warm and dry. No rash noted.       Significant Labs: All pertinent labs within the past 24 hours have been reviewed.    Significant Imaging: I have reviewed all pertinent imaging results/findings within the past 24 hours.      Assessment/Plan:      * Cecum perforation  - Cecal perforation with pneumoperitoneum, hepatic and colonic masses as noted on CT.  Likely metastatic colon cancer.  - General surgery, palliative care consulted in ED. Surgical intervention would most likely be extensive, potentially with diverting ileostomy; given underlying masses highly suspicious for metastatic malignancy (colorectal versus  hepatic), discuss goals of care with the patient's daughter and son (in transit), as the patient's daughter states her goals of care would not be consistent with pursuing treatment for cancer even if surgical intervention would be desired.  - Continue comfort care measures: oral morphine concentrate every 4 hours ATC with hydromorphone 1 mg IV hourly PRN breakthrough pain, ondansetron 4mg IV q6hr PRN and promethazine 6.25mg IV q6hr PRN as first/second choices for nausea respectively.  Add Ativan for severe anxiety.  - Monitor for worsening pain, agitation, anxiety.  - Appreciate palliative care assistance. Likely pursue hospice placement unless she expires in the next day or so.  Family not currently willing to accept immediate transfer.    Colonic mass  - As under perforation.    Microcytic anemia  - Noted.    Pneumoperitoneum  - As under perforation.    Goals of care, counseling/discussion  - As under perforation.    Liver mass  - As under perforation.      VTE Risk Mitigation (From admission, onward)        Ordered     IP VTE HIGH RISK PATIENT  Once      08/26/19 1456     Reason for No Pharmacological VTE Prophylaxis  Once      08/26/19 1456                Sangita Dumas MD  Department of Hospital Medicine   [facility]

## 2019-08-27 NOTE — CONSULTS
Palliative Care Progress Note:    Pt lying on left lateral side. HOB 45*. Daughter Tara, sister and family friend at . Pt resting calmly. No grimacing, moaning or restlessness noted. Daughter states Pt tolerated oral Morphine well.  Family states they feel she is comfortable and cared for. No needs at present. Will follow tomorrow.

## 2019-08-28 LAB — BACTERIA UR CULT: ABNORMAL

## 2019-08-28 PROCEDURE — 99233 PR SUBSEQUENT HOSPITAL CARE,LEVL III: ICD-10-PCS | Mod: ,,, | Performed by: HOSPITALIST

## 2019-08-28 PROCEDURE — 63600175 PHARM REV CODE 636 W HCPCS: Performed by: INTERNAL MEDICINE

## 2019-08-28 PROCEDURE — 99233 SBSQ HOSP IP/OBS HIGH 50: CPT | Mod: ,,, | Performed by: HOSPITALIST

## 2019-08-28 PROCEDURE — 63600175 PHARM REV CODE 636 W HCPCS: Performed by: HOSPITALIST

## 2019-08-28 PROCEDURE — 11000001 HC ACUTE MED/SURG PRIVATE ROOM

## 2019-08-28 PROCEDURE — 25000003 PHARM REV CODE 250: Performed by: HOSPITALIST

## 2019-08-28 PROCEDURE — 94761 N-INVAS EAR/PLS OXIMETRY MLT: CPT

## 2019-08-28 RX ORDER — ONDANSETRON 4 MG/1
4 TABLET, ORALLY DISINTEGRATING ORAL EVERY 8 HOURS PRN
Start: 2019-08-28

## 2019-08-28 RX ORDER — LORAZEPAM 2 MG/ML
1 CONCENTRATE ORAL EVERY 8 HOURS PRN
Start: 2019-08-28 | End: 2019-09-27

## 2019-08-28 RX ADMIN — MORPHINE SULFATE 10 MG: 10 SOLUTION ORAL at 01:08

## 2019-08-28 RX ADMIN — MORPHINE SULFATE 10 MG: 10 SOLUTION ORAL at 02:08

## 2019-08-28 RX ADMIN — ONDANSETRON 4 MG: 2 INJECTION INTRAMUSCULAR; INTRAVENOUS at 03:08

## 2019-08-28 RX ADMIN — HYDROMORPHONE HYDROCHLORIDE 1 MG: 1 INJECTION, SOLUTION INTRAMUSCULAR; INTRAVENOUS; SUBCUTANEOUS at 08:08

## 2019-08-28 RX ADMIN — MORPHINE SULFATE 10 MG: 10 SOLUTION ORAL at 05:08

## 2019-08-28 RX ADMIN — HYDROMORPHONE HYDROCHLORIDE 1 MG: 1 INJECTION, SOLUTION INTRAMUSCULAR; INTRAVENOUS; SUBCUTANEOUS at 04:08

## 2019-08-28 RX ADMIN — HYDROMORPHONE HYDROCHLORIDE 1 MG: 1 INJECTION, SOLUTION INTRAMUSCULAR; INTRAVENOUS; SUBCUTANEOUS at 03:08

## 2019-08-28 RX ADMIN — MORPHINE SULFATE 10 MG: 10 SOLUTION ORAL at 10:08

## 2019-08-28 RX ADMIN — LORAZEPAM 1 MG: 2 SOLUTION, CONCENTRATE ORAL at 02:08

## 2019-08-28 RX ADMIN — LORAZEPAM 1 MG: 2 SOLUTION, CONCENTRATE ORAL at 11:08

## 2019-08-28 RX ADMIN — MORPHINE SULFATE 10 MG: 10 SOLUTION ORAL at 09:08

## 2019-08-28 RX ADMIN — MORPHINE SULFATE 10 MG: 10 SOLUTION ORAL at 07:08

## 2019-08-28 NOTE — PLAN OF CARE
Pt family has had positive experience with [facility] hosp[facility] in the past.  MD to discuss options for inpatient hospice with family today. Referral  Process begun in [facility].    Jim at [facility] will meet with family at 5:30 tonight.      Anticipate pt discharge to inpatient hospice on Thursday.     Awaiting MD hospice orders.    CM to continue to follow.

## 2019-08-28 NOTE — PROGRESS NOTES
[facility] Hospital Medicine  Progress Note    Patient Name: Cammy Brown  MRN: [id number]  Patient Class: IP- Inpatient   Admission Date: 8/26/2019  Length of Stay: 2 days  Attending Physician: Sangita Luo MD  Primary Care Provider: Domo Owens MD        Subjective:     Principal Problem:Cecum perforation        HPI:  Ms. Brown is a 89/F with PMH HTN, recurrent UTIs who presented to ED 08/26 with a one day history of intermittent worsening abdominal pain, primarily from the RLQ. History is obtained primarily from the patient's daughter, who is her primary caregiver. She denied any fevers or chills at home, but did notice the patient had been more somnolent over the two days prior to admission. The patient minimized symptoms herself, but her daughter noted her moaning and crying in pain and subsequently brought her for evaluation in the ED. CT Abdomen was performed and revealed large volume of free intraperitoneal air with cecal dilation to 11.3cm and abnormal narrowing distally extending 4.6cm suspicious for malignancy, as well as a 7.6cm R hepatic mass. Hospital medicine was contacted for admission.    Overview/Hospital Course:  Patient was admitted and general surgery was consulted.  They did not feel surgery was indicated as patient was unlikely to survive the procedure and the tumor burden was severe.  Family agreed to DNR status and to make patient comfortable.  Palliative care followed while she was here.  Family was reluctant to allow patient to be transferred to inpatient hospice, so we spent a couple of days addressing her comfort needs and making sure she was comfortable enough to be moved.    Interval History:  Patient sleeping after getting morphine.  Son at bedside reports she had significant pain earlier.  We discussed possibly transferring patient to hospice tomorrow.    Review of Systems   Unable to perform ROS: Acuity of condition     Objective:     Vital  Signs (Most Recent):  Temp: 98.5 °F (36.9 °C) (08/28/19 0704)  Pulse: 80 (08/28/19 0704)  Resp: 18 (08/28/19 0704)  BP: (!) 165/83 (08/28/19 0704)  SpO2: (!) 91 % (08/28/19 0704) Vital Signs (24h Range):  Temp:  [98.5 °F (36.9 °C)-98.7 °F (37.1 °C)] 98.5 °F (36.9 °C)  Pulse:  [76-80] 80  Resp:  [16-18] 18  SpO2:  [91 %-99 %] 91 %  BP: (141-165)/(73-83) 165/83     Weight: 77.4 kg (170 lb 10.2 oz)  Body mass index is 28.4 kg/m².    Intake/Output Summary (Last 24 hours) at 8/28/2019 1517  Last data filed at 8/28/2019 0411  Gross per 24 hour   Intake --   Output 300 ml   Net -300 ml      Physical Exam   Constitutional: She appears well-developed.   Elderly woman, sleeping comfortably.   HENT:   Head: Normocephalic.   Eyes: Pupils are equal, round, and reactive to light. Conjunctivae are normal.   Neck: Neck supple. No thyromegaly present.   Cardiovascular: Normal rate, regular rhythm and intact distal pulses. Exam reveals no gallop and no friction rub.   Murmur heard.  Pulmonary/Chest: Effort normal.   Breath sounds decreased throughout.   Abdominal: Soft.   Large ventral hernia noted but abdomen not palpated.   Musculoskeletal: She exhibits no edema.   Lymphadenopathy:     She has no cervical adenopathy.   Skin: Skin is warm and dry. No rash noted.       Significant Labs: All pertinent labs within the past 24 hours have been reviewed.    Significant Imaging: I have reviewed all pertinent imaging results/findings within the past 24 hours.      Assessment/Plan:      * Cecum perforation  - Cecal perforation with pneumoperitoneum, hepatic and colonic masses as noted on CT.  Likely metastatic colon cancer.  - General surgery, palliative care consulted in ED. Surgical intervention would most likely be extensive, potentially with diverting ileostomy; given underlying masses highly suspicious for metastatic malignancy (colorectal versus hepatic), discuss goals of care with the patient's daughter and son (in transit), as the  patient's daughter states her goals of care would not be consistent with pursuing treatment for cancer even if surgical intervention would be desired.  - Continue comfort care measures: oral morphine concentrate every 4 hours ATC with hydromorphone 1 mg IV hourly PRN breakthrough pain, ondansetron 4mg IV q6hr PRN and promethazine 6.25mg IV q6hr PRN as first/second choices for nausea respectively.  Add Ativan for severe anxiety.  - Monitor for worsening pain, agitation, anxiety.  - Appreciate palliative care assistance. Will pursue hospice placement tomorrow unless she expires overnight.  Family starting to accept the need for transfer.    Colonic mass  - As under perforation.    Microcytic anemia  - Noted.    Pneumoperitoneum  - As under perforation.    Goals of care, counseling/discussion  - As under perforation.    Liver mass  - As under perforation.      VTE Risk Mitigation (From admission, onward)        Ordered     IP VTE HIGH RISK PATIENT  Once      08/26/19 1456     Reason for No Pharmacological VTE Prophylaxis  Once      08/26/19 1456                Sangita Dumas MD  Department of Hospital Medicine   [facility]

## 2019-08-28 NOTE — PLAN OF CARE
Problem: Adult Inpatient Plan of Care  Goal: Plan of Care Review  Outcome: Ongoing (interventions implemented as appropriate)  Bed in low and locked position and able to reposition per self with min assist.  Purposeful rounding performed during shift and remains free of injury during shift.

## 2019-08-28 NOTE — PLAN OF CARE
Problem: Adult Inpatient Plan of Care  Goal: Plan of Care Review  Outcome: Ongoing (interventions implemented as appropriate)  POC reviewed with Pt. No significant events this shift. VSS on 2LPM NC. Pain controlled with scheduled and PRN meds. Incontinence care provided. Weight shift assistance provided.Bed lowered and locked, side rails up x3, call light within reach. Will continue to monitor.

## 2019-08-28 NOTE — ASSESSMENT & PLAN NOTE
- Cecal perforation with pneumoperitoneum, hepatic and colonic masses as noted on CT.  Likely metastatic colon cancer.  - General surgery, palliative care consulted in ED. Surgical intervention would most likely be extensive, potentially with diverting ileostomy; given underlying masses highly suspicious for metastatic malignancy (colorectal versus hepatic), discuss goals of care with the patient's daughter and son (in transit), as the patient's daughter states her goals of care would not be consistent with pursuing treatment for cancer even if surgical intervention would be desired.  - Continue comfort care measures: oral morphine concentrate every 4 hours ATC with hydromorphone 1 mg IV hourly PRN breakthrough pain, ondansetron 4mg IV q6hr PRN and promethazine 6.25mg IV q6hr PRN as first/second choices for nausea respectively.  Add Ativan for severe anxiety.  - Monitor for worsening pain, agitation, anxiety.  - Appreciate palliative care assistance. Will pursue hospice placement tomorrow unless she expires overnight.  Family starting to accept the need for transfer.

## 2019-08-28 NOTE — CONSULTS
"Palliative Care Progress Note:    Pt leaning on left lateral side, resting.  No grimacing, moaning or restlessness noted. Son Lenny Brown on the phone, stated "Mom has been comfortable. The Morphine is working. I'm talking on the phone to my boss. Do you need something or can you come back later?". Will monitor.  "

## 2019-08-28 NOTE — SUBJECTIVE & OBJECTIVE
Interval History:  Patient sleeping after getting morphine.  Son at bedside reports she had significant pain earlier.  We discussed possibly transferring patient to hospice tomorrow.    Review of Systems   Unable to perform ROS: Acuity of condition     Objective:     Vital Signs (Most Recent):  Temp: 98.5 °F (36.9 °C) (08/28/19 0704)  Pulse: 80 (08/28/19 0704)  Resp: 18 (08/28/19 0704)  BP: (!) 165/83 (08/28/19 0704)  SpO2: (!) 91 % (08/28/19 0704) Vital Signs (24h Range):  Temp:  [98.5 °F (36.9 °C)-98.7 °F (37.1 °C)] 98.5 °F (36.9 °C)  Pulse:  [76-80] 80  Resp:  [16-18] 18  SpO2:  [91 %-99 %] 91 %  BP: (141-165)/(73-83) 165/83     Weight: 77.4 kg (170 lb 10.2 oz)  Body mass index is 28.4 kg/m².    Intake/Output Summary (Last 24 hours) at 8/28/2019 1517  Last data filed at 8/28/2019 0411  Gross per 24 hour   Intake --   Output 300 ml   Net -300 ml      Physical Exam   Constitutional: She appears well-developed.   Elderly woman, sleeping comfortably.   HENT:   Head: Normocephalic.   Eyes: Pupils are equal, round, and reactive to light. Conjunctivae are normal.   Neck: Neck supple. No thyromegaly present.   Cardiovascular: Normal rate, regular rhythm and intact distal pulses. Exam reveals no gallop and no friction rub.   Murmur heard.  Pulmonary/Chest: Effort normal.   Breath sounds decreased throughout.   Abdominal: Soft.   Large ventral hernia noted but abdomen not palpated.   Musculoskeletal: She exhibits no edema.   Lymphadenopathy:     She has no cervical adenopathy.   Skin: Skin is warm and dry. No rash noted.       Significant Labs: All pertinent labs within the past 24 hours have been reviewed.    Significant Imaging: I have reviewed all pertinent imaging results/findings within the past 24 hours.

## 2019-08-28 NOTE — PLAN OF CARE
[facility]  Department of Hospital Medicine  [address] [place] [address]  [phone] after hours  [phone] fax    HOSPICE  ORDERS    08/29/2019    Admit to Hospice:   Inpatient Service      Diagnoses:   Active Hospital Problems    Diagnosis  POA    *Cecum perforation [K35.32]  Yes     Priority: 1 - High    Metastatic cancer to liver [C78.7]  Yes     Priority: 2     Colonic mass [K63.9]  Yes     Priority: 3     Delirium [R41.0]  Yes    Liver mass [R16.0]  Yes    Encounter for palliative care [Z51.5]  Not Applicable    Pneumoperitoneum [K66.8]  Yes    Microcytic anemia [D50.9]  Yes     Chronic      Resolved Hospital Problems   No resolved problems to display.       Hospice Qualifying Diagnoses:  Metastatic cancer of unknown primary       Patient has a life expectancy < 6 months due to:  1) Primary Hospice Diagnosis:  Obstructing colon tumor with resulting perforation  Comorbid Conditions Contributing to Decline: Metastatic disease of liver, anemia, pneumoperitoneum    Vital Signs: Routine per Hospice Protocol.    Code Status:  DNR    Allergies:   Review of patient's allergies indicates:   Allergen Reactions    Aspirin Shortness Of Breath       Diet: Pleasure feedings as tolerated    Activities: As tolerated    Nursing:  Slaughter catheter care every 12 hours    Oxygen:  2 liters NC prn for patient comfort    Medications:    Cammy Brown   Home Medication Instructions SINCERE:[id number]    Printed on:08/28/19 6669   Medication Information                      dorzolamide-timolol 2-0.5% (COSOPT) 22.3-6.8 mg/mL ophthalmic solution  Place 1 drop into both eyes 2 (two) times daily.             lorazepam 2 mg/ml oral conc (ATIVAN) 2 mg/mL Conc  Take 0.5 mLs (1 mg total) by mouth every 8 (eight) hours as needed.             morphine 20 mg/mL concentrated syringe  Take 0.5 mLs (10 mg total) by mouth every 2 (two) hours as needed for Pain.             ondansetron  (ZOFRAN-ODT) 4 MG TbDL  Take 1 tablet (4 mg total) by mouth every 8 (eight) hours as needed.                   Future Orders:  Hospice Medical Director may dictate new orders for comfortable care measures & sign death certificate.        _________________________________  Sangita Dumas MD  08/29/2019

## 2019-08-29 VITALS
OXYGEN SATURATION: 95 % | HEIGHT: 65 IN | BODY MASS INDEX: 28.43 KG/M2 | SYSTOLIC BLOOD PRESSURE: 153 MMHG | DIASTOLIC BLOOD PRESSURE: 76 MMHG | WEIGHT: 170.63 LBS | TEMPERATURE: 97 F | HEART RATE: 94 BPM | RESPIRATION RATE: 8 BRPM

## 2019-08-29 PROBLEM — Z51.5 ENCOUNTER FOR PALLIATIVE CARE: Status: ACTIVE | Noted: 2019-08-26

## 2019-08-29 PROBLEM — C78.7 METASTATIC CANCER TO LIVER: Status: ACTIVE | Noted: 2019-08-29

## 2019-08-29 PROBLEM — R41.0 DELIRIUM: Status: ACTIVE | Noted: 2019-08-29

## 2019-08-29 PROCEDURE — 63600175 PHARM REV CODE 636 W HCPCS: Performed by: HOSPITALIST

## 2019-08-29 PROCEDURE — 25000003 PHARM REV CODE 250: Performed by: HOSPITALIST

## 2019-08-29 PROCEDURE — 99238 HOSP IP/OBS DSCHRG MGMT 30/<: CPT | Mod: ,,, | Performed by: HOSPITALIST

## 2019-08-29 PROCEDURE — 99238 PR HOSPITAL DISCHARGE DAY,<30 MIN: ICD-10-PCS | Mod: ,,, | Performed by: HOSPITALIST

## 2019-08-29 RX ADMIN — HYDROMORPHONE HYDROCHLORIDE 1 MG: 1 INJECTION, SOLUTION INTRAMUSCULAR; INTRAVENOUS; SUBCUTANEOUS at 02:08

## 2019-08-29 RX ADMIN — MORPHINE SULFATE 10 MG: 10 SOLUTION ORAL at 06:08

## 2019-08-29 RX ADMIN — MORPHINE SULFATE 10 MG: 10 SOLUTION ORAL at 09:08

## 2019-08-29 RX ADMIN — HYDROMORPHONE HYDROCHLORIDE 1 MG: 1 INJECTION, SOLUTION INTRAMUSCULAR; INTRAVENOUS; SUBCUTANEOUS at 12:08

## 2019-08-29 RX ADMIN — HYDROMORPHONE HYDROCHLORIDE 1 MG: 1 INJECTION, SOLUTION INTRAMUSCULAR; INTRAVENOUS; SUBCUTANEOUS at 04:08

## 2019-08-29 RX ADMIN — LORAZEPAM 1 MG: 2 SOLUTION, CONCENTRATE ORAL at 01:08

## 2019-08-29 RX ADMIN — MORPHINE SULFATE 10 MG: 10 SOLUTION ORAL at 01:08

## 2019-08-29 NOTE — CONSULTS
"Palliative Care Progress Note:    Pt resting lying supine in bed. Ro from [facility] at . Pt resting comfortably.  Pt able to shake head "yes" and "No" to questions. No grimacing, moaning or restlessness noted. POC is to discharge Pt to [facility] inpatient facility today. Pt able to state that she is comfortable at present.   "

## 2019-08-29 NOTE — PLAN OF CARE
Pt medically cleared for DC to hospice.     Consents signed and orders sent to [facility].     ADT30 placed, stretcher transport to arrive by 1300.    Bedside RN to call report to [phone].     No further DC needs from CM perspective.       08/29/19 1215   Final Note   Assessment Type Final Discharge Note   Anticipated Discharge Disposition [facility]   What phone number can be called within the next 1-3 days to see how you are doing after discharge? [phone]   Hospital Follow Up  Appt(s) scheduled? Yes   Discharge plans and expectations educations in teach back method with documentation complete? Yes   Right Care Referral Info   Post Acute Recommendation Other

## 2019-08-29 NOTE — PROGRESS NOTES
Gave patient report to charge nurse Sudha at [facility]. Verbalized understanding and denied any questions regarding patient. Transport currently at bedside. Patient VS stable. Slaughter catheter in place to aid voiding. IV removed with catheter tip fully intact. All belongings with patient's family.

## 2019-08-29 NOTE — PLAN OF CARE
spoke with nurse and palliative care nurse and provided a compassionate presence and prayer support for patient as well as reflective listening for family.

## 2019-08-29 NOTE — DISCHARGE SUMMARY
[facility] Hospital Medicine  Discharge Summary      Patient Name: Cammy Brown  MRN: [id number]  Admission Date: 8/26/2019  Hospital Length of Stay: 3 days  Discharge Date and Time: 8/29/2019  2:11 PM  Attending Physician: Katie att. providers found   Discharging Provider: Sangita Dumas MD  Primary Care Provider: Domo Owens MD      HPI:   Ms. Brown is a 89 year old woman with PMH HTN, recurrent UTIs who presented to ED 08/26 with a one day history of intermittent worsening abdominal pain, primarily from the RLQ. History is obtained primarily from the patient's daughter, who is her primary caregiver. She denied any fevers or chills at home, but did notice the patient had been more somnolent over the two days prior to admission. The patient minimized symptoms herself, but her daughter noted her moaning and crying in pain and subsequently brought her for evaluation in the ED. CT Abdomen was performed and revealed large volume of free intraperitoneal air with cecal dilation to 11.3cm and abnormal narrowing distally extending 4.6cm suspicious for malignancy, as well as a 7.6cm R hepatic mass. Hospital medicine was contacted for admission.          Hospital Course:   Patient was admitted and general surgery was consulted.  They did not feel surgery was indicated as patient was unlikely to survive the procedure and the tumor burden was severe.  Family agreed to DNR status and to make patient comfortable.  Palliative care followed while she was here.  Family was reluctant to allow patient to be transferred to inpatient hospice, so we spent a couple of days addressing her comfort needs and making sure she was comfortable enough to be moved.  Patient's pain and anxiety were well controlled on discharge but she was unable to swallow and she was usually somnolent.  She exhibited delirium when awake.  Patient's daughter and sons were involved in the decision making and she was transferred to  [facility] for inpatient hospice care on 8/29.     Consults:  Lyndsay Jenkins NP, Palliative Care, Dr. Herman Lizarraga, General Surgery      Final Active Diagnoses:    Diagnosis Date Noted POA    PRINCIPAL PROBLEM:  Cecum perforation [K35.32] 08/26/2019 Yes    Metastatic cancer to liver [C78.7] 08/29/2019 Yes    Colonic mass [K63.9] 08/26/2019 Yes    Delirium [R41.0] 08/29/2019 Yes    Liver mass [R16.0] 08/26/2019 Yes    Encounter for palliative care [Z51.5] 08/26/2019 Not Applicable    Pneumoperitoneum [K66.8] 08/26/2019 Yes    Microcytic anemia [D50.9] 08/26/2019 Yes     Chronic      Problems Resolved During this Admission:       Discharged Condition:  Terminally ill    Disposition: Hospice/Medical Facility    Significant Diagnostic Studies:   EXAMINATION:  CT ABDOMEN PELVIS WITH CONTRAST    CLINICAL HISTORY:  RLQ pain, appendicitis suspected;    TECHNIQUE:  Low dose axial images, sagittal and coronal reformations were obtained from the lung bases to the pubic symphysis following the IV administration of 75 mL of Omnipaque 350 .  Oral contrast was not given.    COMPARISON:  CT abdomen and pelvis 01/01/2014    FINDINGS:  There is bibasilar subsegmental atelectasis.  There is no significant pleural effusion.  There are prominent coronary artery calcifications.    There has been interval development of a large ill-defined hypoattenuating right hepatic lobe mass measuring 7.6 x 7.1 cm.  There is a subcentimeter hypodensity at the hepatic dome, similar to prior examination.  The gallbladder is surgically absent.  There is mild dilatation of the extrahepatic common bile duct and mild intrahepatic biliary ductal dilatation, mildly increased in extent compared to prior examination.    The stomach, spleen, and pancreas appear within normal limits.  There are small bilateral adrenal nodules which appear similar in size compared to prior examination.  The larger nodule on the left measures 1.3 cm in size.    The kidneys  enhance symmetrically.  There are right-sided parapelvic cysts.  There is no evidence of hydronephrosis.  There is a nonobstructing left renal calculus.  The urinary bladder is unremarkable. The uterus is surgically absent.    The abdominal aorta is mildly ectatic with moderate calcified noncalcified atherosclerotic plaque along its course.  There is no retroperitoneal hematoma.    There is a large volume of free intraperitoneal air.  The cecum is markedly distended measuring 11.3 cm in diameter.  There is apparent abnormal luminal narrowing just distal to the dilated cecum extending approximately 4.6 cm in length suspicious for underlying mass within the ascending colon (for example coronal series, image 57).  Small bowel appears within normal limits.  There is postoperative change of prior mesh hernia repair.  There appear to be at least two recurrent ventral hernias along the superior margin of the mesh containing large bowel, free air, and mesenteric fat.  There is an additional smaller hernia along the inferior margin of the mesh containing mesenteric fat as well as free air.  There is scattered diverticulosis without evidence of acute diverticulitis.  The appendix is not definitively visualized.  There is no portal venous gas.    The osseous structures demonstrate degenerative changes of the spine.  There is a mild superior endplate compression deformity of the T12 vertebral body, new from prior examination.  The extraperitoneal soft tissues are unremarkable.      Impression     1.  Large volume of free intraperitoneal air which is favored to arise from the markedly distended cecum.  There is apparent abnormal luminal narrowing just distal to the dilated cecum concerning for underlying ascending colon mass/malignancy.  Surgical consultation is advised.    2.  Interval development of a large ill-defined 7.6 cm right hepatic lobe mass concerning for malignancy, possibly metastatic in etiology.    3.   Postoperative change of prior ventral hernia repair.  There are multiple recurrence hernias along the superior and inferior mesh margin, some of which contain large bowel.    4.  Status post cholecystectomy.  Mild intra and extrahepatic biliary ductal dilatation, mildly increased in extent compared to prior examination.    5.  Stable appearing bilateral adrenal nodules.    6.  Interval development of mild T12 superior endplate compression deformity, new compared to prior study of January 2014.    7.  Additional incidental findings as above.    Preliminary findings were discussed with Dr. Becerra by myself at 07:31 hours on 08/26/2019.  This report was flagged in Epic as abnormal.    Electronically signed by: Mehdi Frank MD  Date: 08/26/2019       Medications:  Reconciled Home Medications:      Medication List      START taking these medications    lorazepam 2 mg/ml oral conc 2 mg/mL Conc  Commonly known as:  ATIVAN  Take 0.5 mLs (1 mg total) by mouth every 8 (eight) hours as needed.     morphine 20 mg/mL concentrated syringe  Take 0.5 mLs (10 mg total) by mouth every 2 (two) hours as needed for Pain.     ondansetron 4 MG Tbdl  Commonly known as:  ZOFRAN-ODT  Take 1 tablet (4 mg total) by mouth every 8 (eight) hours as needed.        CONTINUE taking these medications    dorzolamide-timolol 2-0.5% 22.3-6.8 mg/mL ophthalmic solution  Commonly known as:  COSOPT  Place 1 drop into both eyes 2 (two) times daily.        STOP taking these medications    darifenacin 15 mg 24 hr tablet  Commonly known as:  ENABLEX     EYE DROPS DISPENSER MISC     simvastatin 40 MG tablet  Commonly known as:  ZOCOR     trimethoprim 100 mg Tab  Commonly known as:  TRIMPEX            Indwelling Lines/Drains at time of discharge:   Lines/Drains/Airways     Drain                 Urethral Catheter 08/29/19 1131 Non-latex 16 Fr. less than 1 day                Time spent on the discharge of patient: <30 minutes  Patient was seen and examined on the  date of discharge and determined to be suitable for discharge.         Sangita Dumas MD  Department of Hospital Medicine  [facility]

## 2019-08-29 NOTE — PROGRESS NOTES
Received a call from [facility] at 0832 regarding patient lab finding. ESBL found in urine.  0837 - first attempt made to contact Dr. Luo.  0839 - Dr. Luo notified of ESBL finding with read back verification. Instructed to place patient on contact isolation.  Contact isolation sign and supplies hung on patient room door. Will continue to monitor patient.

## 2019-08-29 NOTE — PROGRESS NOTES
[facility] at bedside to transport patient.  Patient transported in NAD.  Son at bedside.  Will continue to monitor.

## 2019-08-29 NOTE — CONSULTS
Palliative Care Progress Note:    Daughter Tara at BS. Discussed POC for mother's transfer to [facility]. Tara allowed to verbalize feelings. Emotional support given. Pt lying in bed, resting. NAD noted. No grimacing, moaning or restlessness noted. Tara encouraged to call PC for needs or concerns.

## 2019-08-30 NOTE — PHYSICIAN QUERY
PT Name: Cammy Brown  MR #: [id number]     Physician Query Form - Diagnosis Clarification      CDS/: Nette Priest RN, CDS               Contact information: [email or web address]                                                                                                                        [phone]    This form is a permanent document in the medical record.     Query Date: August 30, 2019    By submitting this query, we are merely seeking further clarification of documentation.  Please utilize your independent clinical judgment when addressing the question(s) below.     The medical record contains the following:      Findings Supporting Clinical Information Location in Medical Record     Likely metastatic colon cancer         89/F with PMH HTN, recurrent UTIs who presented to ED 08/26 with a one day history of intermittent worsening abdominal pain, primarily from the RLQ...CT Abdomen was performed and revealed large volume of free intraperitoneal air with cecal dilation to 11.3cm and abnormal narrowing distally extending 4.6cm suspicious for malignancy, as well as a 7.6cm R hepatic mass    given underlying masses highly suspicious for metastatic malignancy (colorectal versus hepatic), discuss goals of care with the patient's daughter and son...Likely pursue palliative placement     CT scan of the abdomen were performed this morning.  These showed a near obstructing lesion in the ascending colon with a large metastasis to the right lobe of the liver     patient has advanced cancer and that this could not be cured.   HM PN 8/27      H&P 8/26                  H&P 8/26            General Surgery Consult 8/26        General Surgery Consult 8/26     Please clarify if the __likely metastatic colon cancer____ diagnosis has been:    [ x ] Ruled In   [  ] Ruled Out   [  ] Other/Clarification of findings (please specify):     [  ] Clinically undetermined     Please document in your progress notes daily  "for the duration of treatment, until resolved, and include in your discharge summary.         I documented "metastatic cancer to liver."  This probably came from the colon, but without a biopsy it is not clear.  If your question is whether "LIKELY" metastatic colon cancer diagnosis is ruled in then yes, it is.                                                                       "

## 2019-08-31 LAB
BACTERIA BLD CULT: NORMAL
BACTERIA BLD CULT: NORMAL

## 2022-06-22 NOTE — PLAN OF CARE
spoke with palliative care nurse and provided a compassionate presence and update regarding visit from [facility] for patient's daughter.     Have You Had Botox Before?: has had botox

## 2024-09-13 NOTE — SUBJECTIVE & OBJECTIVE
Past Medical History:   Diagnosis Date    Hyperlipemia     UTI (urinary tract infection)        Past Surgical History:   Procedure Laterality Date    CHOLECYSTECTOMY      HEMORRHOID SURGERY      HERNIA REPAIR      HYSTERECTOMY      KNEE SURGERY         Review of patient's allergies indicates:   Allergen Reactions    Aspirin Shortness Of Breath       No current facility-administered medications on file prior to encounter.      Current Outpatient Prescriptions on File Prior to Encounter   Medication Sig    trimethoprim (TRIMPEX) 100 mg Tab Take 100 mg by mouth once daily. Daily for 2 months, then off for 2 weeks, (scheduled to restart 4/12/17)    darifenacin (ENABLEX) 15 mg 24 hr tablet Take 15 mg by mouth once daily.    dorzolamide-timolol 2-0.5% (COSOPT) 22.3-6.8 mg/mL ophthalmic solution Place 1 drop into both eyes 2 (two) times daily.    EYE DROPS DISPENSER MISC Place 1 drop into both eyes 2 (two) times daily.     simvastatin (ZOCOR) 40 MG tablet Take 40 mg by mouth every evening.     Family History     None        Social History Main Topics    Smoking status: Never Smoker    Smokeless tobacco: Not on file    Alcohol use No    Drug use: No    Sexual activity: Not on file     Review of Systems   Constitutional: Positive for activity change and fatigue. Negative for appetite change and fever.   HENT: Negative for congestion, ear pain, rhinorrhea and sinus pressure.    Eyes: Negative for pain and discharge.   Respiratory: Negative for cough, chest tightness, shortness of breath and wheezing.    Cardiovascular: Negative for chest pain and leg swelling.   Gastrointestinal: Negative for abdominal distention, abdominal pain, diarrhea, nausea and vomiting.   Endocrine: Negative for cold intolerance and heat intolerance.   Genitourinary: Positive for dysuria and flank pain. Negative for difficulty urinating, frequency, hematuria and urgency.   Musculoskeletal: Positive for myalgias. Negative for  arthralgias and joint swelling.   Allergic/Immunologic: Negative for environmental allergies and food allergies.   Neurological: Positive for weakness. Negative for dizziness, light-headedness and headaches.   Hematological: Does not bruise/bleed easily.   Psychiatric/Behavioral: Negative for agitation, behavioral problems and decreased concentration.     Objective:     Vital Signs (Most Recent):  Temp: 98.4 °F (36.9 °C) (03/14/18 2000)  Pulse: 68 (03/14/18 2232)  Resp: 16 (03/14/18 2100)  BP: 124/63 (03/14/18 2047)  SpO2: 96 % (03/14/18 2232) Vital Signs (24h Range):  Temp:  [97.6 °F (36.4 °C)-100.9 °F (38.3 °C)] 98.4 °F (36.9 °C)  Pulse:  [] 68  Resp:  [16] 16  SpO2:  [93 %-99 %] 96 %  BP: (124-164)/() 124/63     Weight: 72.4 kg (159 lb 9.8 oz)  Body mass index is 27.4 kg/m².    Physical Exam   Constitutional: She is oriented to person, place, and time. She appears well-developed and well-nourished.   HENT:   Head: Normocephalic.   Eyes: Conjunctivae are normal. Right eye exhibits no discharge. Left eye exhibits no discharge.   Neck: Normal range of motion. Neck supple.   Cardiovascular: Normal rate, regular rhythm, normal heart sounds and intact distal pulses.    Pulses:       Radial pulses are 1+ on the right side, and 1+ on the left side.        Dorsalis pedis pulses are 1+ on the right side, and 1+ on the left side.   Pulmonary/Chest: Effort normal. No respiratory distress. She has decreased breath sounds in the right lower field and the left lower field.   Abdominal: Soft. She exhibits no distension. Bowel sounds are decreased. There is no tenderness.   Musculoskeletal: Normal range of motion.   Neurological: She is alert and oriented to person, place, and time. She has normal strength. GCS eye subscore is 4. GCS verbal subscore is 5. GCS motor subscore is 6.   Skin: Skin is warm and dry. Capillary refill takes 2 to 3 seconds.   Psychiatric: She has a normal mood and affect. Her speech is normal  and behavior is normal. Thought content normal.           Significant Labs:   CBC:   Recent Labs  Lab 03/14/18  1813   WBC 12.30   HGB 13.3   HCT 41.1        CMP:   Recent Labs  Lab 03/14/18  1813      K 3.7      CO2 20*   *   BUN 17   CREATININE 0.7   CALCIUM 9.3   PROT 7.3   ALBUMIN 3.6   BILITOT 0.9   ALKPHOS 60   AST 17   ALT 13   ANIONGAP 12   EGFRNONAA >60     Urine Studies:   Recent Labs  Lab 03/14/18  1940   COLORU Yellow   APPEARANCEUA Hazy*   PHUR 6.0   SPECGRAV 1.025   PROTEINUA 2+*   GLUCUA Negative   KETONESU 1+*   BILIRUBINUA Negative   OCCULTUA 2+*   NITRITE Positive*   UROBILINOGEN Negative   LEUKOCYTESUR 2+*   RBCUA 5*   WBCUA >100*   BACTERIA Many*   HYALINECASTS 0       Significant Imaging: I have reviewed all pertinent imaging results/findings within the past 24 hours.   Impaired gait/IV and/or equipment tethered to patient